# Patient Record
Sex: MALE | Race: BLACK OR AFRICAN AMERICAN | NOT HISPANIC OR LATINO
[De-identification: names, ages, dates, MRNs, and addresses within clinical notes are randomized per-mention and may not be internally consistent; named-entity substitution may affect disease eponyms.]

---

## 2019-04-24 ENCOUNTER — APPOINTMENT (OUTPATIENT)
Dept: ORTHOPEDIC SURGERY | Facility: CLINIC | Age: 54
End: 2019-04-24
Payer: COMMERCIAL

## 2019-04-24 PROBLEM — Z00.00 ENCOUNTER FOR PREVENTIVE HEALTH EXAMINATION: Status: ACTIVE | Noted: 2019-04-24

## 2019-04-24 PROCEDURE — 20610 DRAIN/INJ JOINT/BURSA W/O US: CPT | Mod: 50

## 2019-04-24 PROCEDURE — 99213 OFFICE O/P EST LOW 20 MIN: CPT | Mod: 25

## 2019-04-24 NOTE — PROCEDURE
[de-identified] : Patient has demonstrated limited relief from NSAIDS, rest, exercises / PT, and after discussion of the risks and benefits, the patient has elected to proceed with a corticosteroid injection into the BOTH knee(s) via an Anterolateral site.\par Confirmed that the patient does not have history of prior adverse reactions, active, infections, or relevant allergies.   There was no erythema or warmth, and the skin was clear.  The skin was sterilized with alcohol and via sterile technique, the knee was injected 3 cc of 1% xylocaine with 5 mg Kenalog.  The injection was completed without complication and a bandage was applied.  The patient tolerated the procedure well and was given post-injection instructions.

## 2019-04-24 NOTE — HISTORY OF PRESENT ILLNESS
[de-identified] : Patient is status patient coming of complaints of bilateral knee pain. Is a diagnoses of bilateral knee arthritis. He's been managed with pain medication home exercises injections in the past.

## 2019-04-24 NOTE — PHYSICAL EXAM
[de-identified] : Constitutional: General Appearance: healthy-appearing, NAD, and normal body habitus.  \par \par \par Gait and Station: Appearance: normal gait, no limp, and ambulates with no assitive devices.  \par \par \par Knees: Inspection Left: well healed previous surgical incision. Bony Palpation Right: no tenderness of the inferior pole patella, the superior pole patella, the tibial tubercle, the adductor tubercle, the medial joint line, the lateral joint line, the lateral tibial plateau, or Gerdy's tubercle and tenderness of the lateral patellar facet, the medial patellar facet, the medial femoral condyle, the medial tibial plateau, and the lateral femoral condyle. Bony Palpation Left: no tenderness of the inferior pole patella, the superior pole patella, the tibial tubercle, the adductor tubercle, the medial joint line, the lateral joint line, the lateral tibial plateau, or Gerdy's tubercle and tenderness of the lateral patellar facet, the medial patellar facet, the medial femoral condyle, the medial tibial plateau, and the lateral femoral condyle. Soft Tissue Palpation Right: tenderness of the lateral patellar retinaculum and the medial patellar retinaculum. Soft Tissue Palpation Left: tenderness of the lateral patellar retinaculum and the medial patellar retinaculum. Active Range of Motion Right: crepitus, flexion (120 deg), extension (3 deg.), and pain on flexion at (120 deg.). Active Range of Motion Left: crepitus, flexion (125 deg.), extension (3 deg.), and pain on flexion at (125 deg.). Strength Right: flexion 5/5 and extension 5/5. Strength Left: flexion 5/5 and extension 5/5.  \par \par \par Skin: Right Lower Extremity: normal. Left Lower Extremity: normal.  \par \par \par Cardiovascular System: Varicosities Right: capillary refill test normal. Varicosities Left: capillary refill test normal.  \par \par \par Neurologic: Sensation on the Right: normal sensation. Sensation on the Left: normal sensation.  \par \par \par Psychiatric: Mood and Affect: normal mood and affect and active and alert.

## 2019-05-29 ENCOUNTER — OTHER (OUTPATIENT)
Age: 54
End: 2019-05-29

## 2019-06-24 ENCOUNTER — OTHER (OUTPATIENT)
Age: 54
End: 2019-06-24

## 2019-07-30 ENCOUNTER — OTHER (OUTPATIENT)
Age: 54
End: 2019-07-30

## 2019-09-06 ENCOUNTER — OTHER (OUTPATIENT)
Age: 54
End: 2019-09-06

## 2019-10-03 ENCOUNTER — RX RENEWAL (OUTPATIENT)
Age: 54
End: 2019-10-03

## 2019-10-30 ENCOUNTER — APPOINTMENT (OUTPATIENT)
Dept: ORTHOPEDIC SURGERY | Facility: CLINIC | Age: 54
End: 2019-10-30
Payer: COMMERCIAL

## 2019-10-30 PROCEDURE — 20610 DRAIN/INJ JOINT/BURSA W/O US: CPT | Mod: 50

## 2019-10-30 PROCEDURE — 99213 OFFICE O/P EST LOW 20 MIN: CPT | Mod: 25

## 2019-10-30 NOTE — PHYSICAL EXAM
[de-identified] : Constitutional: General Appearance: healthy-appearing, NAD, and normal body habitus.  \par \par \par Gait and Station: Appearance: normal gait, no limp, and ambulates with no assitive devices.  \par \par \par Knees: Inspection Left: well healed previous surgical incision. Bony Palpation Right: no tenderness of the inferior pole patella, the superior pole patella, the tibial tubercle, the adductor tubercle, the medial joint line, the lateral joint line, the lateral tibial plateau, or Gerdy's tubercle and tenderness of the lateral patellar facet, the medial patellar facet, the medial femoral condyle, the medial tibial plateau, and the lateral femoral condyle. Bony Palpation Left: no tenderness of the inferior pole patella, the superior pole patella, the tibial tubercle, the adductor tubercle, the medial joint line, the lateral joint line, the lateral tibial plateau, or Gerdy's tubercle and tenderness of the lateral patellar facet, the medial patellar facet, the medial femoral condyle, the medial tibial plateau, and the lateral femoral condyle. Soft Tissue Palpation Right: tenderness of the lateral patellar retinaculum and the medial patellar retinaculum. Soft Tissue Palpation Left: tenderness of the lateral patellar retinaculum and the medial patellar retinaculum. Active Range of Motion Right: crepitus, flexion (120 deg), extension (3 deg.), and pain on flexion at (120 deg.). Active Range of Motion Left: crepitus, flexion (125 deg.), extension (3 deg.), and pain on flexion at (125 deg.). Strength Right: flexion 5/5 and extension 5/5. Strength Left: flexion 5/5 and extension 5/5.  \par \par \par Skin: Right Lower Extremity: normal. Left Lower Extremity: normal.  \par \par \par Cardiovascular System: Varicosities Right: capillary refill test normal. Varicosities Left: capillary refill test normal.  \par \par \par Neurologic: Sensation on the Right: normal sensation. Sensation on the Left: normal sensation.  \par \par \par Psychiatric: Mood and Affect: normal mood and affect and active and alert.

## 2019-10-30 NOTE — HISTORY OF PRESENT ILLNESS
[de-identified] : Patient is status patient coming of complaints of bilateral knee pain. Is a diagnoses of bilateral knee arthritis. He's been managed with pain medication home exercises injections in the past.

## 2019-10-30 NOTE — PROCEDURE
[de-identified] : Patient has demonstrated limited relief from NSAIDS, rest, exercises / PT, and after discussion of the risks and benefits, the patient has elected to proceed with a corticosteroid injection into the BOTH knee(s) via an Anterolateral site.\par Confirmed that the patient does not have history of prior adverse reactions, active, infections, or relevant allergies.   There was no erythema or warmth, and the skin was clear.  The skin was sterilized with alcohol and via sterile technique, the knee was injected 3 cc of 1% xylocaine with 5 mg Kenalog.  The injection was completed without complication and a bandage was applied.  The patient tolerated the procedure well and was given post-injection instructions.

## 2019-11-13 ENCOUNTER — RX RENEWAL (OUTPATIENT)
Age: 54
End: 2019-11-13

## 2019-11-14 ENCOUNTER — RX RENEWAL (OUTPATIENT)
Age: 54
End: 2019-11-14

## 2019-11-20 ENCOUNTER — RX RENEWAL (OUTPATIENT)
Age: 54
End: 2019-11-20

## 2019-12-31 ENCOUNTER — RX RENEWAL (OUTPATIENT)
Age: 54
End: 2019-12-31

## 2020-07-07 ENCOUNTER — APPOINTMENT (OUTPATIENT)
Dept: ORTHOPEDIC SURGERY | Facility: CLINIC | Age: 55
End: 2020-07-07
Payer: COMMERCIAL

## 2020-07-07 VITALS — WEIGHT: 205 LBS | BODY MASS INDEX: 32.18 KG/M2 | HEIGHT: 67 IN

## 2020-07-07 VITALS — TEMPERATURE: 95.1 F

## 2020-07-07 PROCEDURE — 20610 DRAIN/INJ JOINT/BURSA W/O US: CPT | Mod: 50

## 2020-07-07 PROCEDURE — 99213 OFFICE O/P EST LOW 20 MIN: CPT | Mod: 25

## 2020-07-08 NOTE — ASSESSMENT
[FreeTextEntry1] : Discussed at length with patient exam history and imaging.  Patient is time elects repeat injection and I will send him for consultation with Dr. Rosales as he is entertaining total knee replacements at this time\par

## 2020-07-08 NOTE — PHYSICAL EXAM
[de-identified] : Bilateral knee\par \par Constitutional: \par The patient is healthy-appearing and in no apparent distress. \par \par Gait:\par The patient ambulates with a normal gait and no limp.\par \par Cardiovascular System: \par The capillary refill is less than 2 seconds. \par \par Skin: \par There are no skin abnormalities.\par \par Right Knee:\par  \par Bony Palpation: \par There is tenderness of the medial joint line. \par There is no tenderness of the lateral joint line.\par There is tenderness of the medial femoral chondyle.\par There is tenderness of the lateral femoral chondyle.\par There is no tenderness of the tibial tubercle.\par There is no tenderness of the superior patella.\par There is no tenderness of the inferior patella.\par There is tenderness of the medial patellar facet.\par There is tenderness of the lateral patellar facet.\par \par Soft Tissue Palpation: \par There is no tenderness of the medial retinaculum.\par There is no tenderness of the lateral retinaculum.\par There is no tenderness of the quadriceps tendon.\par There is no tenderness of the patella tendon.\par There is no tenderness of the ITB.\par There is no tenderness of the pes anserine.\par \par Active Range of Motion: \par The range of motion at the knee actively and passively is 3 - 125. \par \par Special Tests: \par There is a negative Apley.\par There is a negative Steinmanns. \par There is a negative Lachman and Anterior Drawer.\par There is a negative Posterior Drawer.  \par There is no varus or valgus laxity.\par \par Strength: \par There is 5/5 hip flexion and 5/5 knee flexion and extension.  \par \par Left Knee:\par  \par Bony Palpation: \par There is tenderness of the medial joint line. \par There is tenderness of the lateral joint line.\par There is tenderness of the medial femoral chondyle.\par There is tenderness of the lateral femoral chondyle.\par There is no tenderness of the tibial tubercle.\par There is no tenderness of the superior patella.\par There is no tenderness of the inferior patella.\par There is tenderness of the medial patellar facet.\par There is tenderness of the lateral patellar facet.\par \par Soft Tissue Palpation: \par There is no tenderness of the medial retinaculum.\par There is no tenderness of the lateral retinaculum.\par There is no tenderness of the quadriceps tendon.\par There is no tenderness of the patella tendon.\par There is no tenderness of the ITB.\par There is no tenderness of the pes anserine.\par \par Active Range of Motion: \par The range of motion at the knee actively and passively is 3 - 125. \par \par Special Tests: \par There is a negative Apley.\par There is a negative Steinmanns. \par There is a negative Lachman and Anterior Drawer.\par There is a negative Posterior Drawer.  \par There is no varus or valgus laxity.\par \par Strength: \par There is 5/5 hip flexion and 5/5 knee flexion and extension.  \par \par Psychiatric: \par The patient demonstrates a normal mood and affect and is active and alert\par

## 2020-07-08 NOTE — HISTORY OF PRESENT ILLNESS
[de-identified] : Patient is status patient coming of complaints of bilateral knee pain. Is a diagnoses of bilateral knee arthritis. He's been managed with pain medication as well as home exercises and injections in the past.

## 2020-07-08 NOTE — PROCEDURE
[de-identified] : Patient has demonstrated limited relief from NSAIDS, rest, exercises / PT, and after discussion of the risks and benefits, the patient has elected to proceed with a corticosteroid injection into the BOTH knee(s) via an Anterolateral site.\par Confirmed that the patient does not have history of prior adverse reactions, active, infections, or relevant allergies.   There was no erythema or warmth, and the skin was clear.  The skin was sterilized with alcohol and via sterile technique, the knee was injected 3 cc of 1% xylocaine with 5 mg Kenalog.  The injection was completed without complication and a bandage was applied.  The patient tolerated the procedure well and was given post-injection instructions.

## 2020-11-03 ENCOUNTER — APPOINTMENT (OUTPATIENT)
Dept: ORTHOPEDIC SURGERY | Facility: CLINIC | Age: 55
End: 2020-11-03
Payer: COMMERCIAL

## 2020-11-03 VITALS — WEIGHT: 205 LBS | HEIGHT: 67 IN | BODY MASS INDEX: 32.18 KG/M2

## 2020-11-03 PROCEDURE — 99072 ADDL SUPL MATRL&STAF TM PHE: CPT

## 2020-11-03 PROCEDURE — 20610 DRAIN/INJ JOINT/BURSA W/O US: CPT | Mod: 50

## 2020-11-03 PROCEDURE — 99213 OFFICE O/P EST LOW 20 MIN: CPT | Mod: 25

## 2020-11-03 NOTE — HISTORY OF PRESENT ILLNESS
[de-identified] : Patient is an established patient coming of complaints of bilateral knee pain with a diagnoses of bilateral knee arthritis. He's been managed with pain medication as well as home exercises and injections in the past.

## 2020-11-03 NOTE — ASSESSMENT
[FreeTextEntry1] : Discussed at length with patient exam history and imaging.  Patient is time elects repeat injection\par

## 2020-11-03 NOTE — PROCEDURE
[de-identified] : Patient has demonstrated limited relief from NSAIDS, rest, exercises / PT, and after discussion of the risks and benefits, the patient has elected to proceed with a corticosteroid injection into the BOTH knees via an Anterolateral site.\par Confirmed that the patient does not have history of prior adverse reactions, active, infections, or relevant allergies.   There was no erythema or warmth, and the skin was clear.  The skin was sterilized with alcohol and via sterile technique, the knee was injected 3 cc of 1% xylocaine with 5 mg Kenalog.  The injection was completed without complication and a bandage was applied.  The patient tolerated the procedure well and was given post-injection instructions.

## 2020-11-03 NOTE — PHYSICAL EXAM
[de-identified] : Bilateral knee\par \par Constitutional: \par The patient is healthy-appearing and in no apparent distress. \par \par Gait:\par The patient ambulates with a normal gait and no limp.\par \par Cardiovascular System: \par The capillary refill is less than 2 seconds. \par \par Skin: \par There are no skin abnormalities.\par \par Right Knee:\par  \par Bony Palpation: \par There is tenderness of the medial joint line. \par There is no tenderness of the lateral joint line.\par There is tenderness of the medial femoral chondyle.\par There is tenderness of the lateral femoral chondyle.\par There is no tenderness of the tibial tubercle.\par There is no tenderness of the superior patella.\par There is no tenderness of the inferior patella.\par There is tenderness of the medial patellar facet.\par There is tenderness of the lateral patellar facet.\par \par Soft Tissue Palpation: \par There is no tenderness of the medial retinaculum.\par There is no tenderness of the lateral retinaculum.\par There is no tenderness of the quadriceps tendon.\par There is no tenderness of the patella tendon.\par There is no tenderness of the ITB.\par There is no tenderness of the pes anserine.\par \par Active Range of Motion: \par The range of motion at the knee actively and passively is 3 - 125. \par \par Special Tests: \par There is a negative Apley.\par There is a negative Steinmanns. \par There is a negative Lachman and Anterior Drawer.\par There is a negative Posterior Drawer.  \par There is no varus or valgus laxity.\par \par Strength: \par There is 5/5 hip flexion and 5/5 knee flexion and extension.  \par \par Left Knee:\par  \par Bony Palpation: \par There is tenderness of the medial joint line. \par There is tenderness of the lateral joint line.\par There is tenderness of the medial femoral chondyle.\par There is tenderness of the lateral femoral chondyle.\par There is no tenderness of the tibial tubercle.\par There is no tenderness of the superior patella.\par There is no tenderness of the inferior patella.\par There is tenderness of the medial patellar facet.\par There is tenderness of the lateral patellar facet.\par \par Soft Tissue Palpation: \par There is no tenderness of the medial retinaculum.\par There is no tenderness of the lateral retinaculum.\par There is no tenderness of the quadriceps tendon.\par There is no tenderness of the patella tendon.\par There is no tenderness of the ITB.\par There is no tenderness of the pes anserine.\par \par Active Range of Motion: \par The range of motion at the knee actively and passively is 3 - 125. \par \par Special Tests: \par There is a negative Apley.\par There is a negative Steinmanns. \par There is a negative Lachman and Anterior Drawer.\par There is a negative Posterior Drawer.  \par There is no varus or valgus laxity.\par \par Strength: \par There is 5/5 hip flexion and 5/5 knee flexion and extension.  \par \par Psychiatric: \par The patient demonstrates a normal mood and affect and is active and alert\par

## 2020-12-22 ENCOUNTER — RX RENEWAL (OUTPATIENT)
Age: 55
End: 2020-12-22

## 2021-05-12 ENCOUNTER — NON-APPOINTMENT (OUTPATIENT)
Age: 56
End: 2021-05-12

## 2021-05-12 ENCOUNTER — APPOINTMENT (OUTPATIENT)
Dept: ORTHOPEDIC SURGERY | Facility: CLINIC | Age: 56
End: 2021-05-12
Payer: COMMERCIAL

## 2021-05-12 PROCEDURE — 99072 ADDL SUPL MATRL&STAF TM PHE: CPT

## 2021-05-12 PROCEDURE — 99214 OFFICE O/P EST MOD 30 MIN: CPT

## 2021-05-12 RX ORDER — NABUMETONE 500 MG/1
500 TABLET, FILM COATED ORAL
Qty: 60 | Refills: 2 | Status: ACTIVE | COMMUNITY
Start: 2020-07-07 | End: 1900-01-01

## 2021-05-12 NOTE — PHYSICAL EXAM
[de-identified] : Bilateral knee\par \par Constitutional: \par The patient is healthy-appearing and in no apparent distress. \par \par Gait:\par The patient ambulates with a cane and antalgic limp.\par \par Cardiovascular System: \par The capillary refill is less than 2 seconds. \par \par Skin: \par There are no skin abnormalities.\par \par Right Knee:\par  \par Bony Palpation: \par There is tenderness of the medial joint line. \par There is no tenderness of the lateral joint line.\par There is tenderness of the medial femoral chondyle.\par There is tenderness of the lateral femoral chondyle.\par There is no tenderness of the tibial tubercle.\par There is no tenderness of the superior patella.\par There is no tenderness of the inferior patella.\par There is tenderness of the medial patellar facet.\par There is tenderness of the lateral patellar facet.\par \par Soft Tissue Palpation: \par There is no tenderness of the medial retinaculum.\par There is no tenderness of the lateral retinaculum.\par There is no tenderness of the quadriceps tendon.\par There is no tenderness of the patella tendon.\par There is no tenderness of the ITB.\par There is no tenderness of the pes anserine.\par \par Active Range of Motion: \par The range of motion at the knee actively and passively is 3 - 125. \par \par Special Tests: \par There is a negative Apley.\par There is a negative Steinmanns. \par There is a negative Lachman and Anterior Drawer.\par There is a negative Posterior Drawer.  \par There is no varus or valgus laxity.\par \par Strength: \par There is 5/5 hip flexion and 5/5 knee flexion and extension.  \par \par Left Knee:\par  \par Bony Palpation: \par There is tenderness of the medial joint line. \par There is tenderness of the lateral joint line.\par There is tenderness of the medial femoral chondyle.\par There is tenderness of the lateral femoral chondyle.\par There is no tenderness of the tibial tubercle.\par There is no tenderness of the superior patella.\par There is no tenderness of the inferior patella.\par There is tenderness of the medial patellar facet.\par There is tenderness of the lateral patellar facet.\par \par Soft Tissue Palpation: \par There is no tenderness of the medial retinaculum.\par There is no tenderness of the lateral retinaculum.\par There is no tenderness of the quadriceps tendon.\par There is no tenderness of the patella tendon.\par There is no tenderness of the ITB.\par There is no tenderness of the pes anserine.\par \par Active Range of Motion: \par The range of motion at the knee actively and passively is 3 - 125. \par \par Special Tests: \par There is a negative Apley.\par There is a negative Steinmanns. \par There is a negative Lachman and Anterior Drawer.\par There is a negative Posterior Drawer.  \par There is no varus or valgus laxity.\par \par Strength: \par There is 5/5 hip flexion and 5/5 knee flexion and extension.  \par \par Psychiatric: \par The patient demonstrates a normal mood and affect and is active and alert\par  [de-identified] : X-ray bilateral knee.  There is severe medial compartment arthritis on the left otherwise moderate to severe tricompartmental arthritis bilaterally

## 2021-05-12 NOTE — ASSESSMENT
[FreeTextEntry1] : Discussed at length the patient underlying arthritis and treatment options and at this point patient would like to proceed with total knee replacement.  He was given a recommendation for a joint replacement surgeon specializing in total knee replacements.  Patient will contact the office and will the office contact him to schedule an appointment

## 2021-05-12 NOTE — HISTORY OF PRESENT ILLNESS
5/17/2017      Jaqueline Bonilla  [address] [facility] [id number]        Dear Jaqueline:    Our medical records indicate that you are over due for:    Well Woman Exam/PAP test:  To screen for cervical cancer.  Your last recorded pap was done in July of 2011, and advised to be re-screened in 5 years.    Please contact us at the clinic if you have these tests done elsewhere, so that we can update your medical record and have the results forwarded to this office.    Please call our office at [phone] as soon as possible to make an appointment and/or to get these tests ordered.  Feel free to call with any questions or concerns.    Your health is important to us -- cancer screening is important for you!    Sincerely,  [facility]              [address]  [place] [address]  Telephone: [phone] Fax: [phone]  [email or web address]      
[de-identified] : Patient is an established patient last seen back in November with bilateral knee arthritis.  He states he is coming in for further discussion as the pain is severe to the point that he is considering total knee replacements.

## 2021-05-24 ENCOUNTER — APPOINTMENT (OUTPATIENT)
Dept: ORTHOPEDIC SURGERY | Facility: CLINIC | Age: 56
End: 2021-05-24
Payer: COMMERCIAL

## 2021-05-24 VITALS
BODY MASS INDEX: 29.03 KG/M2 | SYSTOLIC BLOOD PRESSURE: 120 MMHG | WEIGHT: 185 LBS | DIASTOLIC BLOOD PRESSURE: 80 MMHG | HEIGHT: 67 IN

## 2021-05-24 PROCEDURE — 99072 ADDL SUPL MATRL&STAF TM PHE: CPT

## 2021-05-24 PROCEDURE — 99215 OFFICE O/P EST HI 40 MIN: CPT

## 2021-05-24 RX ORDER — MELOXICAM 15 MG/1
15 TABLET ORAL DAILY
Qty: 30 | Refills: 2 | Status: ACTIVE | COMMUNITY
Start: 2021-05-24 | End: 1900-01-01

## 2021-05-24 RX ORDER — TRAMADOL HYDROCHLORIDE 50 MG/1
50 TABLET, COATED ORAL
Qty: 90 | Refills: 0 | Status: ACTIVE | COMMUNITY
Start: 2021-05-24 | End: 1900-01-01

## 2021-05-24 NOTE — HISTORY OF PRESENT ILLNESS
[Worsening] : worsening [___ yrs] : [unfilled] year(s) ago [8] : a current pain level of 8/10 [Bending] : worsened by bending [Sitting] : worsened by sitting [Walking] : worsened by walking [Opioid Analgesics] : relieved by opioid analgesics [de-identified] : 54y/o male referred by Dr. Szymanski for evaluation of bilateral knee osteoarthritis. Symptoms have been progressive for over 10 years. Has been treated in past with serial injections, most recently CSI in Nov 2020, with decreasing efficacy. Also had HA injections with limited relief; Tylenol and various NSAIDs without much relief; also taking 1-2 Percocets a day which he feels is the only medication which keeps the pain at bay. Right side is more painful but both knees are severely stiff. He walks with a cane at all times and struggles to make it a single block. Was placed on medical leave from his job as a  6 months ago because his boss felt he was unsteady and unsafe at the job side due to his knees. \par \par Denies PMH but notes bilateral lower extremity edema that has been present for some months. PSH significant for 4x left patellar tendon surgeries including to address a postoperative infection and a removal of hardware, beginning in 2011. No prior R knee surgeries. [de-identified] : describes constamt shooting pain 98.4

## 2021-05-24 NOTE — PHYSICAL EXAM
[de-identified] : General appearance: well nourished and hydrated, pleasant, alert and oriented x 3, cooperative.  \par HEENT: normocephalic, EOM intact, wearing mask, external auditory canal clear.  \par Cardiovascular: symmetric bilateral 2+ pitting lower leg and foot/ankle edema, no varicosities, dorsalis pedis pulses palpable and symmetric.  \par Lymphatics: no palpable lymphadenopathy, no lymphedema.  \par Neurologic: sensation is normal, no muscle weakness in upper or lower extremities, patella tendon reflexes present and symmetric.  \par Dermatologic: skin moist, warm, no rash.  \par Spine: cervical spine with normal lordosis and painless range of motion, thoracic spine with normal kyphosis and painless range of motion, lumbosacral spine with normal lordosis and painless range of motion. \par Gait: crouched gait with cane, bilaterally antalgic.\par \par Left knee:\par - Effusion: moderate\par - Soft tissue swelling: moderate\par - Ecchymosis: none\par - Erythema: none\par - Wounds: healed midline incision\par - Alignment: non-correctable varus\par - Tenderness: circumferential\par - ROM: 20-90\par - Ligamentous laxity: negative Lachman, negative ant. drawer test, negative post. drawer test, stable to varus stress, stable to valgus stress.  \par - Popliteal angle (degrees): 60\par - Quad strength: 5/5\par \par Right knee:\par - Effusion: moderate\par - Soft tissue swelling: moderate\par - Ecchymosis: none\par - Erythema: none\par - Wounds: none\par - Alignment: non-correctable varus\par - Tenderness: circumferential\par - ROM: 20-90\par - Ligamentous laxity: negative Lachman, negative ant. drawer test, negative post. drawer test, stable to varus stress, stable to valgus stress.  \par - Popliteal angle (degrees): 60\par - Quad strength: 5/5 [de-identified] : Bilateral knee XRs available on [employer] [facility] dated 5/12/21 from the [place] location were interpreted by me and reviewed with him.\par \par Right knee --\par Alignment: varus\par Arthritis: severe tricompartmental, KL4\par Deformity: none\par Patellar height: normal\par Patellar tracking: central\par \par Left knee --\par Alignment: varus\par Arthritis: severe tricompartmental, KL4\par Deformity: none\par Patellar height: normal\par Patellar tracking: central

## 2021-05-24 NOTE — DISCUSSION/SUMMARY
[de-identified] : 54y/o male with bilateral knee osteoarthritis, R > L, s/p left patellar tendon repair\par - Due to his severe pain, flexion contractures, and loss of function despite exhaustive conservative management, he is indicated for simultaneous bilateral total knee arthroplasty (on day of surgery, would start with right side first)\par - We discussed the details of the procedure, the expected recovery period, and the expected outcome. We discussed the likelihood of satisfaction after complete recovery, and the potential causes of dissatisfaction. The importance of active patient participation in the rehabilitation protocol was emphasized, along with its influence on short and long-term outcomes. Specific risks of total knee replacement were discussed in detail. We discussed the risk of surgical site complications including but not limited to: surgical site infection, wound healing complications, bone fracture, tendon or ligament injury, neurovascular injury, hemorrhage, postoperative stiffness or instability, persistent pain and need for reoperation or manipulation under anesthesia. We discussed surgical blood loss and the possible need for blood transfusion. We discussed the risk of perioperative medical complications, including but not limited to catheter-associated urinary tract infection, venous thromboembolism and other cardiopulmonary complications. We discussed anesthetic options and the risk of anesthesia-related complications. We discussed implant fixation methods; my plan would be to use cementless fixation if possible case. We discussed the variable need to resurface the patella; my plan would be to resurface in this case. We discussed the durability of prosthetic knees and limitations related to wear, osteolysis and loosening. All questions were answered the patient's satisfaction. The patient was given a copy of my preoperative packet with additional information about the procedure. I asked the patient to either call back or schedule a followup appointment for any additional questions or concerns regarding the procedure.\par - Book for surgery at a convenient date\par - CT, clearance, class preoperatively

## 2021-05-28 ENCOUNTER — APPOINTMENT (OUTPATIENT)
Dept: INTERNAL MEDICINE | Facility: CLINIC | Age: 56
End: 2021-05-28

## 2021-06-10 ENCOUNTER — APPOINTMENT (OUTPATIENT)
Dept: ORTHOPEDIC SURGERY | Facility: CLINIC | Age: 56
End: 2021-06-10

## 2021-06-28 ENCOUNTER — APPOINTMENT (OUTPATIENT)
Dept: ORTHOPEDIC SURGERY | Facility: CLINIC | Age: 56
End: 2021-06-28
Payer: COMMERCIAL

## 2021-07-08 ENCOUNTER — APPOINTMENT (OUTPATIENT)
Dept: ORTHOPEDIC SURGERY | Facility: CLINIC | Age: 56
End: 2021-07-08
Payer: COMMERCIAL

## 2021-07-08 ENCOUNTER — OUTPATIENT (OUTPATIENT)
Dept: OUTPATIENT SERVICES | Facility: HOSPITAL | Age: 56
LOS: 1 days | End: 2021-07-08
Payer: COMMERCIAL

## 2021-07-08 ENCOUNTER — RESULT REVIEW (OUTPATIENT)
Age: 56
End: 2021-07-08

## 2021-07-08 VITALS
TEMPERATURE: 96.7 F | WEIGHT: 182 LBS | HEART RATE: 82 BPM | BODY MASS INDEX: 28.56 KG/M2 | DIASTOLIC BLOOD PRESSURE: 83 MMHG | OXYGEN SATURATION: 98 % | SYSTOLIC BLOOD PRESSURE: 145 MMHG | HEIGHT: 67 IN

## 2021-07-08 DIAGNOSIS — Z01.818 ENCOUNTER FOR OTHER PREPROCEDURAL EXAMINATION: ICD-10-CM

## 2021-07-08 LAB
A1C WITH ESTIMATED AVERAGE GLUCOSE RESULT: 5 % — SIGNIFICANT CHANGE UP (ref 4–5.6)
ALBUMIN SERPL ELPH-MCNC: 4.2 G/DL — SIGNIFICANT CHANGE UP (ref 3.3–5)
ALP SERPL-CCNC: 85 U/L — SIGNIFICANT CHANGE UP (ref 40–120)
ALT FLD-CCNC: 9 U/L — LOW (ref 10–45)
ANION GAP SERPL CALC-SCNC: 11 MMOL/L — SIGNIFICANT CHANGE UP (ref 5–17)
APPEARANCE UR: CLEAR — SIGNIFICANT CHANGE UP
APTT BLD: 36.1 SEC — HIGH (ref 27.5–35.5)
AST SERPL-CCNC: 14 U/L — SIGNIFICANT CHANGE UP (ref 10–40)
BACTERIA # UR AUTO: PRESENT /HPF
BASOPHILS # BLD AUTO: 0.05 K/UL — SIGNIFICANT CHANGE UP (ref 0–0.2)
BASOPHILS NFR BLD AUTO: 0.6 % — SIGNIFICANT CHANGE UP (ref 0–2)
BILIRUB SERPL-MCNC: 0.3 MG/DL — SIGNIFICANT CHANGE UP (ref 0.2–1.2)
BILIRUB UR-MCNC: NEGATIVE — SIGNIFICANT CHANGE UP
BUN SERPL-MCNC: 44 MG/DL — HIGH (ref 7–23)
CALCIUM SERPL-MCNC: 9.9 MG/DL — SIGNIFICANT CHANGE UP (ref 8.4–10.5)
CHLORIDE SERPL-SCNC: 106 MMOL/L — SIGNIFICANT CHANGE UP (ref 96–108)
CO2 SERPL-SCNC: 23 MMOL/L — SIGNIFICANT CHANGE UP (ref 22–31)
COLOR SPEC: YELLOW — SIGNIFICANT CHANGE UP
CREAT SERPL-MCNC: 2.63 MG/DL — HIGH (ref 0.5–1.3)
DIFF PNL FLD: ABNORMAL
EOSINOPHIL # BLD AUTO: 0.25 K/UL — SIGNIFICANT CHANGE UP (ref 0–0.5)
EOSINOPHIL NFR BLD AUTO: 3.2 % — SIGNIFICANT CHANGE UP (ref 0–6)
ESTIMATED AVERAGE GLUCOSE: 97 MG/DL — SIGNIFICANT CHANGE UP (ref 68–114)
GLUCOSE SERPL-MCNC: 95 MG/DL — SIGNIFICANT CHANGE UP (ref 70–99)
GLUCOSE UR QL: NEGATIVE — SIGNIFICANT CHANGE UP
HCT VFR BLD CALC: 33.8 % — LOW (ref 39–50)
HGB BLD-MCNC: 10.9 G/DL — LOW (ref 13–17)
HYALINE CASTS # UR AUTO: SIGNIFICANT CHANGE UP /LPF (ref 0–2)
IMM GRANULOCYTES NFR BLD AUTO: 0.3 % — SIGNIFICANT CHANGE UP (ref 0–1.5)
INR BLD: 1.21 — HIGH (ref 0.88–1.16)
KETONES UR-MCNC: NEGATIVE — SIGNIFICANT CHANGE UP
LEUKOCYTE ESTERASE UR-ACNC: NEGATIVE — SIGNIFICANT CHANGE UP
LYMPHOCYTES # BLD AUTO: 1.83 K/UL — SIGNIFICANT CHANGE UP (ref 1–3.3)
LYMPHOCYTES # BLD AUTO: 23.6 % — SIGNIFICANT CHANGE UP (ref 13–44)
MCHC RBC-ENTMCNC: 26.1 PG — LOW (ref 27–34)
MCHC RBC-ENTMCNC: 32.2 GM/DL — SIGNIFICANT CHANGE UP (ref 32–36)
MCV RBC AUTO: 80.9 FL — SIGNIFICANT CHANGE UP (ref 80–100)
MONOCYTES # BLD AUTO: 0.64 K/UL — SIGNIFICANT CHANGE UP (ref 0–0.9)
MONOCYTES NFR BLD AUTO: 8.2 % — SIGNIFICANT CHANGE UP (ref 2–14)
NEUTROPHILS # BLD AUTO: 4.98 K/UL — SIGNIFICANT CHANGE UP (ref 1.8–7.4)
NEUTROPHILS NFR BLD AUTO: 64.1 % — SIGNIFICANT CHANGE UP (ref 43–77)
NITRITE UR-MCNC: NEGATIVE — SIGNIFICANT CHANGE UP
NRBC # BLD: 0 /100 WBCS — SIGNIFICANT CHANGE UP (ref 0–0)
PH UR: 5.5 — SIGNIFICANT CHANGE UP (ref 5–8)
PLATELET # BLD AUTO: 199 K/UL — SIGNIFICANT CHANGE UP (ref 150–400)
POTASSIUM SERPL-MCNC: 4.2 MMOL/L — SIGNIFICANT CHANGE UP (ref 3.5–5.3)
POTASSIUM SERPL-SCNC: 4.2 MMOL/L — SIGNIFICANT CHANGE UP (ref 3.5–5.3)
PROT SERPL-MCNC: 8.1 G/DL — SIGNIFICANT CHANGE UP (ref 6–8.3)
PROT UR-MCNC: >=300 MG/DL
PROTHROM AB SERPL-ACNC: 14.4 SEC — HIGH (ref 10.6–13.6)
RBC # BLD: 4.18 M/UL — LOW (ref 4.2–5.8)
RBC # FLD: 13.9 % — SIGNIFICANT CHANGE UP (ref 10.3–14.5)
RBC CASTS # UR COMP ASSIST: < 5 /HPF — SIGNIFICANT CHANGE UP
SODIUM SERPL-SCNC: 140 MMOL/L — SIGNIFICANT CHANGE UP (ref 135–145)
SP GR SPEC: 1.02 — SIGNIFICANT CHANGE UP (ref 1–1.03)
UROBILINOGEN FLD QL: 0.2 E.U./DL — SIGNIFICANT CHANGE UP
WBC # BLD: 7.77 K/UL — SIGNIFICANT CHANGE UP (ref 3.8–10.5)
WBC # FLD AUTO: 7.77 K/UL — SIGNIFICANT CHANGE UP (ref 3.8–10.5)
WBC UR QL: < 5 /HPF — SIGNIFICANT CHANGE UP

## 2021-07-08 PROCEDURE — 93010 ELECTROCARDIOGRAM REPORT: CPT

## 2021-07-08 PROCEDURE — 99214 OFFICE O/P EST MOD 30 MIN: CPT

## 2021-07-08 PROCEDURE — 80053 COMPREHEN METABOLIC PANEL: CPT

## 2021-07-08 PROCEDURE — 93005 ELECTROCARDIOGRAM TRACING: CPT

## 2021-07-08 PROCEDURE — 85730 THROMBOPLASTIN TIME PARTIAL: CPT

## 2021-07-08 PROCEDURE — 71046 X-RAY EXAM CHEST 2 VIEWS: CPT

## 2021-07-08 PROCEDURE — 85025 COMPLETE CBC W/AUTO DIFF WBC: CPT

## 2021-07-08 PROCEDURE — 85610 PROTHROMBIN TIME: CPT

## 2021-07-08 PROCEDURE — 99072 ADDL SUPL MATRL&STAF TM PHE: CPT

## 2021-07-08 PROCEDURE — 71046 X-RAY EXAM CHEST 2 VIEWS: CPT | Mod: 26

## 2021-07-08 PROCEDURE — 87086 URINE CULTURE/COLONY COUNT: CPT

## 2021-07-08 PROCEDURE — 81001 URINALYSIS AUTO W/SCOPE: CPT

## 2021-07-08 PROCEDURE — 83036 HEMOGLOBIN GLYCOSYLATED A1C: CPT

## 2021-07-08 RX ORDER — HYDROCODONE BITARTRATE AND ACETAMINOPHEN 5; 300 MG/1; MG/1
5-300 TABLET ORAL
Qty: 30 | Refills: 0 | Status: COMPLETED | COMMUNITY
Start: 2019-04-24 | End: 2021-07-08

## 2021-07-08 RX ORDER — HYDROCODONE BITARTRATE AND ACETAMINOPHEN 5; 300 MG/1; MG/1
5-300 TABLET ORAL
Qty: 30 | Refills: 0 | Status: COMPLETED | COMMUNITY
Start: 2019-06-24 | End: 2021-07-08

## 2021-07-08 RX ORDER — HYDROCODONE BITARTRATE AND ACETAMINOPHEN 5; 300 MG/1; MG/1
5-300 TABLET ORAL
Qty: 30 | Refills: 0 | Status: COMPLETED | COMMUNITY
Start: 2020-10-26 | End: 2021-07-08

## 2021-07-08 RX ORDER — HYDROCODONE BITARTRATE AND ACETAMINOPHEN 5; 300 MG/1; MG/1
5-300 TABLET ORAL
Qty: 30 | Refills: 0 | Status: COMPLETED | COMMUNITY
Start: 2019-05-29 | End: 2021-07-08

## 2021-07-08 RX ORDER — HYDROCODONE BITARTRATE AND ACETAMINOPHEN 5; 300 MG/1; MG/1
5-300 TABLET ORAL
Qty: 30 | Refills: 0 | Status: COMPLETED | COMMUNITY
Start: 2020-03-06 | End: 2021-07-08

## 2021-07-09 LAB
CULTURE RESULTS: NO GROWTH — SIGNIFICANT CHANGE UP
SPECIMEN SOURCE: SIGNIFICANT CHANGE UP

## 2021-07-12 ENCOUNTER — APPOINTMENT (OUTPATIENT)
Dept: CT IMAGING | Facility: HOSPITAL | Age: 56
End: 2021-07-12

## 2021-07-12 RX ORDER — PREDNISONE 20 MG/1
20 TABLET ORAL
Qty: 14 | Refills: 0 | Status: ACTIVE | COMMUNITY
Start: 2021-03-20

## 2021-07-12 RX ORDER — LABETALOL HYDROCHLORIDE 100 MG/1
100 TABLET, FILM COATED ORAL
Qty: 60 | Refills: 0 | Status: ACTIVE | COMMUNITY
Start: 2021-02-11

## 2021-07-12 RX ORDER — HYDROCODONE BITARTRATE AND ACETAMINOPHEN 5; 325 MG/1; MG/1
5-325 TABLET ORAL
Qty: 60 | Refills: 0 | Status: ACTIVE | COMMUNITY
Start: 2021-02-25

## 2021-07-12 RX ORDER — COLCHICINE 0.6 MG/1
0.6 TABLET ORAL
Qty: 15 | Refills: 0 | Status: ACTIVE | COMMUNITY
Start: 2021-02-11

## 2021-07-12 RX ORDER — OXYCODONE HYDROCHLORIDE AND ACETAMINOPHEN 5; 325 MG/1; MG/1
5-325 TABLET ORAL
Refills: 0 | Status: ACTIVE | COMMUNITY

## 2021-07-12 NOTE — HISTORY OF PRESENT ILLNESS
[No Pertinent Cardiac History] : no history of aortic stenosis, atrial fibrillation, coronary artery disease, recent myocardial infarction, or implantable device/pacemaker [No Pertinent Pulmonary History] : no history of asthma, COPD, sleep apnea, or smoking [No Adverse Anesthesia Reaction] : no adverse anesthesia reaction in self or family member [(Patient denies any chest pain, claudication, dyspnea on exertion, orthopnea, palpitations or syncope)] : Patient denies any chest pain, claudication, dyspnea on exertion, orthopnea, palpitations or syncope [Moderate (4-6 METs)] : Moderate (4-6 METs) [Chronic Anticoagulation] : no chronic anticoagulation [Chronic Kidney Disease] : no chronic kidney disease [Diabetes] : no diabetes [FreeTextEntry1] : Bilateral Knee Arthroplasty [FreeTextEntry2] : 7/8/2021 [FreeTextEntry3] : Dr. Panchito Nunez [FreeTextEntry4] : The patient is a 55 year old man presenting with knee osteoarthritis. The patient is planned for bilateral total knee arthroplasty with Dr. Panchito Nunez on 7/15/2021\par \par The patient denies recent/active cardiopulmonary symptoms including chest pain, shortness of breath, dyspnea of exertion, palpitations, swelling, headache, dizziness, syncope.\par

## 2021-07-12 NOTE — ASSESSMENT
[As per surgery] : as per surgery [Patient NOT optimized for Surgery at this time] : Patient not optimized for surgery at this time [Cardiology consultation] : Cardiology consultation [Other: _____] : [unfilled] [FreeTextEntry4] : The patient is a 55 year old man presenting with knee osteoarthritis. The patient is planned for bilateral total knee arthroplasty with Dr. Panchito Nunez on 7/15/2021\par \par -Labs/Diagnostics reviewed with patient in detail\par -METS >4\par -RCRI = 1,6.0% 30-day risk of death/MI/cardiac arrest\par \par Patient is at moderate risk for moderate risk surgery/procedure\par \par Patient is NOT medically optimized to proceed with surgery.\par \par The risks/benefits/potential complications of diagnostic testing/treatments were described in detail.\par \par **Case discussed with patient's  specialist(s).\par \par \par Recommendations:\par -Labs showing elevated creatinine suspicious for CKD (unclear baseline), and anemia (possibly AOCD), unclear baseline\par -EKG showing TWI in inferior leads\par -Recommend consultation with Neprology and Cardiology

## 2021-07-14 ENCOUNTER — APPOINTMENT (OUTPATIENT)
Dept: HEART AND VASCULAR | Facility: CLINIC | Age: 56
End: 2021-07-14

## 2021-07-21 ENCOUNTER — NON-APPOINTMENT (OUTPATIENT)
Age: 56
End: 2021-07-21

## 2021-07-21 ENCOUNTER — APPOINTMENT (OUTPATIENT)
Dept: HEART AND VASCULAR | Facility: CLINIC | Age: 56
End: 2021-07-21
Payer: COMMERCIAL

## 2021-07-21 ENCOUNTER — APPOINTMENT (OUTPATIENT)
Dept: NEPHROLOGY | Facility: CLINIC | Age: 56
End: 2021-07-21
Payer: COMMERCIAL

## 2021-07-21 VITALS
HEIGHT: 66.54 IN | BODY MASS INDEX: 28.58 KG/M2 | HEART RATE: 66 BPM | OXYGEN SATURATION: 98 % | TEMPERATURE: 97.9 F | DIASTOLIC BLOOD PRESSURE: 100 MMHG | SYSTOLIC BLOOD PRESSURE: 178 MMHG | WEIGHT: 179.99 LBS

## 2021-07-21 VITALS
WEIGHT: 182 LBS | HEIGHT: 67 IN | DIASTOLIC BLOOD PRESSURE: 91 MMHG | HEART RATE: 80 BPM | SYSTOLIC BLOOD PRESSURE: 152 MMHG | BODY MASS INDEX: 28.56 KG/M2

## 2021-07-21 DIAGNOSIS — Z01.818 ENCOUNTER FOR OTHER PREPROCEDURAL EXAMINATION: ICD-10-CM

## 2021-07-21 DIAGNOSIS — I10 ESSENTIAL (PRIMARY) HYPERTENSION: ICD-10-CM

## 2021-07-21 DIAGNOSIS — R94.31 ABNORMAL ELECTROCARDIOGRAM [ECG] [EKG]: ICD-10-CM

## 2021-07-21 DIAGNOSIS — F17.200 NICOTINE DEPENDENCE, UNSPECIFIED, UNCOMPLICATED: ICD-10-CM

## 2021-07-21 DIAGNOSIS — M17.10 UNILATERAL PRIMARY OSTEOARTHRITIS, UNSPECIFIED KNEE: ICD-10-CM

## 2021-07-21 DIAGNOSIS — E87.2 ACIDOSIS: ICD-10-CM

## 2021-07-21 DIAGNOSIS — N18.4 CHRONIC KIDNEY DISEASE, STAGE 4 (SEVERE): ICD-10-CM

## 2021-07-21 DIAGNOSIS — M10.9 GOUT, UNSPECIFIED: ICD-10-CM

## 2021-07-21 DIAGNOSIS — Z72.3 LACK OF PHYSICAL EXERCISE: ICD-10-CM

## 2021-07-21 PROCEDURE — 99072 ADDL SUPL MATRL&STAF TM PHE: CPT

## 2021-07-21 PROCEDURE — 93000 ELECTROCARDIOGRAM COMPLETE: CPT | Mod: NC

## 2021-07-21 PROCEDURE — 99203 OFFICE O/P NEW LOW 30 MIN: CPT

## 2021-07-21 PROCEDURE — 99204 OFFICE O/P NEW MOD 45 MIN: CPT

## 2021-07-21 RX ORDER — AMLODIPINE BESYLATE 10 MG/1
10 TABLET ORAL
Qty: 30 | Refills: 0 | Status: DISCONTINUED | COMMUNITY
Start: 2021-02-11 | End: 2021-07-21

## 2021-07-21 RX ORDER — AMLODIPINE BESYLATE 5 MG/1
5 TABLET ORAL DAILY
Qty: 90 | Refills: 2 | Status: ACTIVE | COMMUNITY
Start: 2021-07-21 | End: 1900-01-01

## 2021-07-21 NOTE — HISTORY OF PRESENT ILLNESS
[Preoperative Visit] : for a medical evaluation prior to surgery [Scheduled Procedure ___] : a [unfilled] [Date of Surgery ___] : on [unfilled] [Surgeon Name ___] : surgeon: [unfilled] [Good] : Good [Prior Anesthesia] : Prior anesthesia [Diabetes] : no diabetes [Cardiovascular Disease] : no cardiovascular disease [Pulmonary Disease] : no pulmonary disease [Anti-Platelet Agents] : no anti-platelet agents [Nicotine Dependence] : no nicotine dependence [Alcohol Use] : no  alcohol use [Renal Disease] : no renal disease [Sleep Apnea] : no sleep apnea [Thromboembolic Problems] : no thromboembolic problems [Clotting Disorder] : no clotting disorder [Frequent use of NSAIDs] : no use of NSAIDs [Bleeding Disorder] : no bleeding disorder [Frequent Aspirin Use] : no frequent aspirin use [Prev Anesthesia Reaction] : no previous anesthesia reaction [FreeTextEntry1] : 55 M \par \par Has not been taking his BP meds for > 1 year.\par Uses walker because of his bilateral knee arthritis.  Able to walk 1-2 blocks stopping due to leg pain.  It got very bad ~  six months ago.  Before that was very active, walked several miles in a day, going to the gym, 45 minutes walking on a steep incline.  No cp or sob.  No leg swelling.  No orthopnea \par No known hx of CAD or CHF\par \par Fhx: no cad\par Former smoker quit 30 years ago\par No EtOH use\par NKDA

## 2021-07-21 NOTE — ASSESSMENT
[FreeTextEntry1] : 55 m\par \par Preop Cardiovascular Risk Assessment\par Abnormal EKG, septal infarct with SARTHAK in V1-V2\par No signs/symptoms of ischemia or heart failure though he is minimally active given knee pain\par Not able to achieve > 4 METs\par RCRI Score \par \par - ECHO\par - coronary CTA\par - labs\par - agree with starting amlodipine 5mg \par - clearance pending above\par

## 2021-07-21 NOTE — PHYSICAL EXAM
[General Appearance - Alert] : alert [General Appearance - In No Acute Distress] : in no acute distress [Sclera] : the sclera and conjunctiva were normal [PERRL With Normal Accommodation] : pupils were equal in size, round, and reactive to light [Outer Ear] : the ears and nose were normal in appearance [Neck Appearance] : the appearance of the neck was normal [Neck Cervical Mass (___cm)] : no neck mass was observed [Jugular Venous Distention Increased] : there was no jugular-venous distention [Auscultation Breath Sounds / Voice Sounds] : lungs were clear to auscultation bilaterally [Heart Rate And Rhythm] : heart rate was normal and rhythm regular [Heart Sounds] : normal S1 and S2 [Heart Sounds Gallop] : no gallops [Murmurs] : no murmurs [Heart Sounds Pericardial Friction Rub] : no pericardial rub [Edema] : there was no peripheral edema [Skin Color & Pigmentation] : normal skin color and pigmentation [Skin Turgor] : normal skin turgor [] : no rash [Deep Tendon Reflexes (DTR)] : deep tendon reflexes were 2+ and symmetric [No Focal Deficits] : no focal deficits [Oriented To Time, Place, And Person] : oriented to person, place, and time [Impaired Insight] : insight and judgment were intact [Affect] : the affect was normal

## 2021-07-21 NOTE — CONSULT LETTER
[Dear  ___] : Dear  [unfilled], [Consult Letter:] : I had the pleasure of evaluating your patient, [unfilled]. [Please see my note below.] : Please see my note below. [Consult Closing:] : Thank you very much for allowing me to participate in the care of this patient.  If you have any questions, please do not hesitate to contact me. [Sincerely,] : Sincerely, [FreeTextEntry2] : Dr Bharath Eastman [FreeTextEntry1] : Thanks very much for the kind referral -we will try to get him back on the surgical schedule as soon as possible.  Best regards Cirilo [FreeTextEntry3] : Sincerely, \par \par Elpidio Dover MD, FACP  [DrAntonio  ___] : Dr. ORTIZ

## 2021-07-21 NOTE — PHYSICAL EXAM
[General Appearance - Well Developed] : well developed [Normal Appearance] : normal appearance [Well Groomed] : well groomed [General Appearance - Well Nourished] : well nourished [No Deformities] : no deformities [General Appearance - In No Acute Distress] : no acute distress [Normal Conjunctiva] : the conjunctiva exhibited no abnormalities [Eyelids - No Xanthelasma] : the eyelids demonstrated no xanthelasmas [Normal Oral Mucosa] : normal oral mucosa [No Oral Pallor] : no oral pallor [No Oral Cyanosis] : no oral cyanosis [Normal Jugular Venous A Waves Present] : normal jugular venous A waves present [Normal Jugular Venous V Waves Present] : normal jugular venous V waves present [No Jugular Venous Batista A Waves] : no jugular venous batista A waves [Respiration, Rhythm And Depth] : normal respiratory rhythm and effort [Exaggerated Use Of Accessory Muscles For Inspiration] : no accessory muscle use [Auscultation Breath Sounds / Voice Sounds] : lungs were clear to auscultation bilaterally [Heart Rate And Rhythm] : heart rate and rhythm were normal [Heart Sounds] : normal S1 and S2 [Murmurs] : no murmurs present [Abdomen Soft] : soft [Abdomen Tenderness] : non-tender [Abdomen Mass (___ Cm)] : no abdominal mass palpated [Abnormal Walk] : normal gait [Gait - Sufficient For Exercise Testing] : the gait was sufficient for exercise testing [Nail Clubbing] : no clubbing of the fingernails [Cyanosis, Localized] : no localized cyanosis [Petechial Hemorrhages (___cm)] : no petechial hemorrhages [Skin Color & Pigmentation] : normal skin color and pigmentation [] : no rash [No Venous Stasis] : no venous stasis [Skin Lesions] : no skin lesions [No Skin Ulcers] : no skin ulcer [No Xanthoma] : no  xanthoma was observed

## 2021-07-21 NOTE — ASSESSMENT
[FreeTextEntry1] : 55-year-old man with severe osteoarthritis of both knees, who appears to have CKD 4 as a likely result of interstitial nephropathy due to chronic use of multiple NSAIDs.  It is possible that hypertension has contributed in some fashion to his CKD as well.  I have told him to avoid all NSAIDs including over-the-counter and prescription, and physically discarded his nabumetone.  I have ordered a renal ultrasound to assess kidney size, echogenicity, and rule out obstructive uropathy.  I have ordered labs to include BMP, H&H, PTH, phosphorus, LANE, uric acid, PTH, phosphorus, and urinalysis.  I suspect that proteinuria will be rather scantly, if my impression of interstitial nephropathy is correct.  He will return within 2 to 3 weeks.  I have also added amlodipine 5 mg daily in an effort to bring his BP into better control.  He would like to proceed with knee replacement as quickly as the situation allows safely.

## 2021-07-21 NOTE — HISTORY OF PRESENT ILLNESS
[FreeTextEntry1] : 55-year-old man who was scheduled for total knee replacement by Dr. Panchito Nunez on July 15 -surgery was canceled because his creatinine came back at 2.63, BUN 44, with a GFR of 26-30.  He has been taking a variety of NSAIDs including Aleve, meloxicam, and nabumetone.  He has a history of hypertension and has previously taken labetalol and amlodipine, but is on no antihypertensive meds at this time.  His BP was 145/83 on July 8.  It was higher here today.  He has been using a walker for the last 7 months, and has been out of work for nearly a year.  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  His current degree of pain impacts his sleep.  He reports more frequent urination lately, but also an increase fluid intake.  He has nocturia, but stream size and force are unchanged.  He has not undergone any imaging of the kidneys.  He also has a history of gout, and his last attack was 1 month ago, fairly typical Protegra.  Fortunately, he responds to colchicine without diarrhea.  He also takes allopurinol 100 mg daily.

## 2021-07-22 DIAGNOSIS — E78.5 HYPERLIPIDEMIA, UNSPECIFIED: ICD-10-CM

## 2021-07-22 LAB
25(OH)D3 SERPL-MCNC: 11.5 NG/ML
CHOLEST SERPL-MCNC: 254 MG/DL
ESTIMATED AVERAGE GLUCOSE: 120 MG/DL
HBA1C MFR BLD HPLC: 5.8 %
HCT VFR BLD CALC: 36 %
HDLC SERPL-MCNC: 32 MG/DL
HGB BLD-MCNC: 11.3 G/DL
LDLC SERPL CALC-MCNC: 196 MG/DL
MAGNESIUM SERPL-MCNC: 2.2 MG/DL
NONHDLC SERPL-MCNC: 222 MG/DL
NT-PROBNP SERPL-MCNC: 780 PG/ML
TRIGL SERPL-MCNC: 128 MG/DL
TSH SERPL-ACNC: 1.5 UIU/ML

## 2021-07-22 RX ORDER — ROSUVASTATIN CALCIUM 20 MG/1
20 TABLET, FILM COATED ORAL
Qty: 90 | Refills: 3 | Status: ACTIVE | COMMUNITY
Start: 2021-07-22 | End: 1900-01-01

## 2021-07-26 ENCOUNTER — APPOINTMENT (OUTPATIENT)
Dept: HEART AND VASCULAR | Facility: CLINIC | Age: 56
End: 2021-07-26
Payer: COMMERCIAL

## 2021-07-26 PROCEDURE — 99072 ADDL SUPL MATRL&STAF TM PHE: CPT

## 2021-07-26 PROCEDURE — 93306 TTE W/DOPPLER COMPLETE: CPT

## 2021-07-30 ENCOUNTER — APPOINTMENT (OUTPATIENT)
Dept: ORTHOPEDIC SURGERY | Facility: HOSPITAL | Age: 56
End: 2021-07-30

## 2021-08-09 RX ORDER — HYDROCODONE BITARTRATE AND ACETAMINOPHEN 5; 300 MG/1; MG/1
5-300 TABLET ORAL
Qty: 40 | Refills: 0 | Status: ACTIVE | COMMUNITY
Start: 2020-11-03 | End: 1900-01-01

## 2021-08-11 ENCOUNTER — APPOINTMENT (OUTPATIENT)
Dept: NEPHROLOGY | Facility: CLINIC | Age: 56
End: 2021-08-11

## 2021-08-11 VITALS
SYSTOLIC BLOOD PRESSURE: 155 MMHG | TEMPERATURE: 100 F | HEIGHT: 67 IN | WEIGHT: 179.9 LBS | HEART RATE: 79 BPM | RESPIRATION RATE: 16 BRPM | OXYGEN SATURATION: 100 % | DIASTOLIC BLOOD PRESSURE: 93 MMHG

## 2021-08-11 LAB
ALBUMIN SERPL ELPH-MCNC: 3.8 G/DL — SIGNIFICANT CHANGE UP (ref 3.3–5)
ALBUMIN SERPL ELPH-MCNC: 4 G/DL — SIGNIFICANT CHANGE UP (ref 3.3–5)
ALP SERPL-CCNC: 133 U/L — HIGH (ref 40–120)
ALP SERPL-CCNC: SIGNIFICANT CHANGE UP (ref 40–120)
ALT FLD-CCNC: 35 U/L — SIGNIFICANT CHANGE UP (ref 10–45)
ALT FLD-CCNC: SIGNIFICANT CHANGE UP (ref 10–45)
ANION GAP SERPL CALC-SCNC: 11 MMOL/L — SIGNIFICANT CHANGE UP (ref 5–17)
ANION GAP SERPL CALC-SCNC: 13 MMOL/L — SIGNIFICANT CHANGE UP (ref 5–17)
AST SERPL-CCNC: 35 U/L — SIGNIFICANT CHANGE UP (ref 10–40)
AST SERPL-CCNC: SIGNIFICANT CHANGE UP (ref 10–40)
BASE EXCESS BLDV CALC-SCNC: 1 MMOL/L — SIGNIFICANT CHANGE UP (ref -2–3)
BASOPHILS # BLD AUTO: 0.03 K/UL — SIGNIFICANT CHANGE UP (ref 0–0.2)
BASOPHILS NFR BLD AUTO: 0.3 % — SIGNIFICANT CHANGE UP (ref 0–2)
BILIRUB SERPL-MCNC: 0.6 MG/DL — SIGNIFICANT CHANGE UP (ref 0.2–1.2)
BILIRUB SERPL-MCNC: 0.6 MG/DL — SIGNIFICANT CHANGE UP (ref 0.2–1.2)
BUN SERPL-MCNC: 32 MG/DL — HIGH (ref 7–23)
BUN SERPL-MCNC: 32 MG/DL — HIGH (ref 7–23)
CA-I SERPL-SCNC: 1.3 MMOL/L — SIGNIFICANT CHANGE UP (ref 1.15–1.33)
CALCIUM SERPL-MCNC: 10.2 MG/DL — SIGNIFICANT CHANGE UP (ref 8.4–10.5)
CALCIUM SERPL-MCNC: 10.3 MG/DL — SIGNIFICANT CHANGE UP (ref 8.4–10.5)
CHLORIDE SERPL-SCNC: 100 MMOL/L — SIGNIFICANT CHANGE UP (ref 96–108)
CHLORIDE SERPL-SCNC: 98 MMOL/L — SIGNIFICANT CHANGE UP (ref 96–108)
CO2 BLDV-SCNC: 28.6 MMOL/L — HIGH (ref 22–26)
CO2 SERPL-SCNC: 22 MMOL/L — SIGNIFICANT CHANGE UP (ref 22–31)
CO2 SERPL-SCNC: 25 MMOL/L — SIGNIFICANT CHANGE UP (ref 22–31)
CREAT SERPL-MCNC: 1.96 MG/DL — HIGH (ref 0.5–1.3)
CREAT SERPL-MCNC: 2.09 MG/DL — HIGH (ref 0.5–1.3)
CRP SERPL-MCNC: 168.5 MG/L — HIGH (ref 0–4)
EOSINOPHIL # BLD AUTO: 0.18 K/UL — SIGNIFICANT CHANGE UP (ref 0–0.5)
EOSINOPHIL NFR BLD AUTO: 2 % — SIGNIFICANT CHANGE UP (ref 0–6)
ERYTHROCYTE [SEDIMENTATION RATE] IN BLOOD: 125 MM/HR — HIGH
GAS PNL BLDV: 136 MMOL/L — SIGNIFICANT CHANGE UP (ref 136–145)
GAS PNL BLDV: SIGNIFICANT CHANGE UP
GLUCOSE SERPL-MCNC: 97 MG/DL — SIGNIFICANT CHANGE UP (ref 70–99)
GLUCOSE SERPL-MCNC: 98 MG/DL — SIGNIFICANT CHANGE UP (ref 70–99)
HCO3 BLDV-SCNC: 27 MMOL/L — SIGNIFICANT CHANGE UP (ref 22–29)
HCT VFR BLD CALC: 33 % — LOW (ref 39–50)
HGB BLD-MCNC: 10.6 G/DL — LOW (ref 13–17)
IMM GRANULOCYTES NFR BLD AUTO: 0.2 % — SIGNIFICANT CHANGE UP (ref 0–1.5)
LYMPHOCYTES # BLD AUTO: 1.89 K/UL — SIGNIFICANT CHANGE UP (ref 1–3.3)
LYMPHOCYTES # BLD AUTO: 20.7 % — SIGNIFICANT CHANGE UP (ref 13–44)
MCHC RBC-ENTMCNC: 25.7 PG — LOW (ref 27–34)
MCHC RBC-ENTMCNC: 32.1 GM/DL — SIGNIFICANT CHANGE UP (ref 32–36)
MCV RBC AUTO: 80.1 FL — SIGNIFICANT CHANGE UP (ref 80–100)
MONOCYTES # BLD AUTO: 0.97 K/UL — HIGH (ref 0–0.9)
MONOCYTES NFR BLD AUTO: 10.6 % — SIGNIFICANT CHANGE UP (ref 2–14)
NEUTROPHILS # BLD AUTO: 6.03 K/UL — SIGNIFICANT CHANGE UP (ref 1.8–7.4)
NEUTROPHILS NFR BLD AUTO: 66.2 % — SIGNIFICANT CHANGE UP (ref 43–77)
NRBC # BLD: 0 /100 WBCS — SIGNIFICANT CHANGE UP (ref 0–0)
PCO2 BLDV: 48 MMHG — SIGNIFICANT CHANGE UP (ref 42–55)
PH BLDV: 7.36 — SIGNIFICANT CHANGE UP (ref 7.32–7.43)
PLATELET # BLD AUTO: 306 K/UL — SIGNIFICANT CHANGE UP (ref 150–400)
PO2 BLDV: 24 MMHG — SIGNIFICANT CHANGE UP
POTASSIUM BLDV-SCNC: 4.9 MMOL/L — SIGNIFICANT CHANGE UP (ref 3.5–5.1)
POTASSIUM SERPL-MCNC: 4.6 MMOL/L — SIGNIFICANT CHANGE UP (ref 3.5–5.3)
POTASSIUM SERPL-MCNC: SIGNIFICANT CHANGE UP (ref 3.5–5.3)
POTASSIUM SERPL-SCNC: 4.6 MMOL/L — SIGNIFICANT CHANGE UP (ref 3.5–5.3)
POTASSIUM SERPL-SCNC: SIGNIFICANT CHANGE UP (ref 3.5–5.3)
PROT SERPL-MCNC: 9 G/DL — HIGH (ref 6–8.3)
PROT SERPL-MCNC: 9.1 G/DL — HIGH (ref 6–8.3)
RBC # BLD: 4.12 M/UL — LOW (ref 4.2–5.8)
RBC # FLD: 14.1 % — SIGNIFICANT CHANGE UP (ref 10.3–14.5)
SAO2 % BLDV: 38.4 % — SIGNIFICANT CHANGE UP
SODIUM SERPL-SCNC: 133 MMOL/L — LOW (ref 135–145)
SODIUM SERPL-SCNC: 136 MMOL/L — SIGNIFICANT CHANGE UP (ref 135–145)
WBC # BLD: 9.12 K/UL — SIGNIFICANT CHANGE UP (ref 3.8–10.5)
WBC # FLD AUTO: 9.12 K/UL — SIGNIFICANT CHANGE UP (ref 3.8–10.5)

## 2021-08-11 PROCEDURE — 73564 X-RAY EXAM KNEE 4 OR MORE: CPT | Mod: 26,50

## 2021-08-11 PROCEDURE — 99285 EMERGENCY DEPT VISIT HI MDM: CPT | Mod: 25

## 2021-08-11 RX ORDER — MORPHINE SULFATE 50 MG/1
4 CAPSULE, EXTENDED RELEASE ORAL ONCE
Refills: 0 | Status: DISCONTINUED | OUTPATIENT
Start: 2021-08-11 | End: 2021-08-11

## 2021-08-11 RX ORDER — OXYCODONE AND ACETAMINOPHEN 5; 325 MG/1; MG/1
1 TABLET ORAL ONCE
Refills: 0 | Status: DISCONTINUED | OUTPATIENT
Start: 2021-08-11 | End: 2021-08-11

## 2021-08-11 RX ADMIN — MORPHINE SULFATE 4 MILLIGRAM(S): 50 CAPSULE, EXTENDED RELEASE ORAL at 23:33

## 2021-08-11 RX ADMIN — OXYCODONE AND ACETAMINOPHEN 1 TABLET(S): 5; 325 TABLET ORAL at 18:28

## 2021-08-11 RX ADMIN — OXYCODONE AND ACETAMINOPHEN 1 TABLET(S): 5; 325 TABLET ORAL at 23:33

## 2021-08-11 NOTE — ED ADULT NURSE NOTE - NS ED NURSE LEVEL OF CONSCIOUSNESS SPEECH
Spoke with Jasmin and patient- he v/u that need to hold off on stress test until on blood thinner for 24hours. Confirmed timing with APN. Pt v/u. He will start eliquis and then reschedule stress test.   Speaking Coherently

## 2021-08-11 NOTE — ED PROVIDER NOTE - OBJECTIVE STATEMENT
55 M pmh htn, CKD, anemia, 55 M pmh htn, CKD, anemia, arthritis p/w b/l knee pain x one year, worse the past 3 days.  Pt reports he is supposed to have b/l knee replacements with Dr. Nunez but surgery delayed due to need for clearance from nephrology and cardiology.  States he has been taking percocet for a long time but ran out about one week ago.  Pt has worsening pain in b/l knees over past 3 days and unable to ambulate.  States he had to call ambulance today because he couldn't get out of bed 2/2 pain. pt had prior surgery L knee for ruptured patella tendon.  Denies f/c, headache, dizziness, chest pain, sob, nv, numbness/weakness, skin changes, fall/trauma.

## 2021-08-11 NOTE — ED PROVIDER NOTE - PROGRESS NOTE DETAILS
synovial fluid analysis c/s crystal-induced arthropathy, not septic arthritis.  pt able to stand but unable to take a step, pain level is 4.  lives alone and feels unsafe.  will admit for pain control and PT eval.  plan is eventual total knee arthroplasty with Dr Nunez. synovial fluid analysis c/s crystal-induced arthropathy, not septic arthritis.  pt able to stand only with significant assistance, but unable to take a step, pain level is 4.  lives with son, but is alone most of the day, and he feels unsafe.  will admit for pain control and PT eval.  plan is eventual total knee arthroplasty with Dr Nunez.

## 2021-08-11 NOTE — ED ADULT NURSE NOTE - OBJECTIVE STATEMENT
Pt presents to ED by EMS C/O bilateral knee pain and swelling worsening in the L knee x 2 days. Pt has hx of L knee surgery with scar, walks with walker at baseline. Denies N/V/D, fevers. Made comfortable, RN continuing to monitor.

## 2021-08-11 NOTE — ED PROVIDER NOTE - SHIFT CHANGE DETAILS
HTN, CKD, arthritis c/o bilat knee pain x 1 yr, worse past few days.  was scheduled for surgery last month but was not medically cleared. percocet at home until 1 week ago, now pain severe and unable to walk even with his walker.  unable to walk in ED.  has bilat knee effusions on exam, no warmth/erythema, low susp for septic arthritis.  pain treated, XR arthritic changes.  seen by ortho, want inflammatory markers.  likely DC home if able to ambulate to follow up with Dr Nunez.  if unable to walk admit for pain control and PT eval.

## 2021-08-11 NOTE — ED ADULT TRIAGE NOTE - CHIEF COMPLAINT QUOTE
PT BIBA from home for c/o of bilateral knee pain x 1 year. PT states has worsened over the past week.

## 2021-08-11 NOTE — ED ADULT NURSE NOTE - CAS EDN DISCHARGE INTERVENTIONS
SW NOTE:  spoke to Melvi SAENZ at [place] and obtained authorization for inpatient psych transfer to [facility] for 4 days (12/6/18 - 12/9/18). Melvi could not give authorization number at this time. To refer back to this case, caller should give pt's insurance policy number: [id number]. Concurrent review is due on 12/10/18 and  is Pawan CADENA ([phone]. [phone]).  spoke to Maya at [facility] for transport. Pt to be transported tonight to [facility]. Pt verbalized consent for worker to inform motherBrianna on this transfer. IV intact

## 2021-08-11 NOTE — ED PROVIDER NOTE - PHYSICAL EXAMINATION
Vitals reviewed  Gen: pt lying comfortably on bed, nad, speaking in full sentences  Skin: wwp, no rash/lesions, no erythema or warmth  HEENT: ncat, eomi, mmm  CV: rrr, no audible m/r/g  Resp: symmetrical expansion, ctab, no w/r/r  Abd: nondistended, soft/nt  Ext: b/l knees w/ flexion to approx 90 degrees limited by pain,  + b/l knee effusions- no overlying erythema or warmth, + vertical scar L knee, FROM all other joint, no peripheral edema/calf ttp, SILT, distal pulses 2+  Neuro: alert/oriented, no focal deficits

## 2021-08-11 NOTE — ED ADULT NURSE NOTE - NSIMPLEMENTINTERV_GEN_ALL_ED
Implemented All Fall Risk Interventions:  [place] to call system. Call bell, personal items and telephone within reach. Instruct patient to call for assistance. Room bathroom lighting operational. Non-slip footwear when patient is off stretcher. Physically safe environment: no spills, clutter or unnecessary equipment. Stretcher in lowest position, wheels locked, appropriate side rails in place. Provide visual cue, wrist band, yellow gown, etc. Monitor gait and stability. Monitor for mental status changes and reorient to person, place, and time. Review medications for side effects contributing to fall risk. Reinforce activity limits and safety measures with patient and family.

## 2021-08-11 NOTE — ED PROVIDER NOTE - CLINICAL SUMMARY MEDICAL DECISION MAKING FREE TEXT BOX
55 M pmh htn, CKD, anemia, arthritis p/w b/l knee pain x one year, worse the past 3 days- no new trauma.  pt lying comfortably on bed, Ext: b/l knees w/ flexion to approx 90 degrees limited by pain,  + b/l knee effusions- no overlying erythema or warmth, + vertical scar L knee, FROM all other joint, no peripheral edema/calf ttp, SILT, distal pulses 2+.  pt unable to ambulate.  likely 2/2 severe arthritis, no signs of septic joint and no new injuries.  will give percocet and obtain xrays,  c/s ortho

## 2021-08-12 ENCOUNTER — INPATIENT (INPATIENT)
Facility: HOSPITAL | Age: 56
LOS: 4 days | Discharge: NURSING, REHAB OR LONG-TERM CARE | DRG: 554 | End: 2021-08-17
Attending: STUDENT IN AN ORGANIZED HEALTH CARE EDUCATION/TRAINING PROGRAM | Admitting: STUDENT IN AN ORGANIZED HEALTH CARE EDUCATION/TRAINING PROGRAM
Payer: COMMERCIAL

## 2021-08-12 DIAGNOSIS — N18.6 END STAGE RENAL DISEASE: ICD-10-CM

## 2021-08-12 DIAGNOSIS — M17.0 BILATERAL PRIMARY OSTEOARTHRITIS OF KNEE: ICD-10-CM

## 2021-08-12 DIAGNOSIS — Z29.9 ENCOUNTER FOR PROPHYLACTIC MEASURES, UNSPECIFIED: ICD-10-CM

## 2021-08-12 LAB
ANION GAP SERPL CALC-SCNC: 10 MMOL/L — SIGNIFICANT CHANGE UP (ref 5–17)
BUN SERPL-MCNC: 42 MG/DL — HIGH (ref 7–23)
CALCIUM SERPL-MCNC: 9.6 MG/DL — SIGNIFICANT CHANGE UP (ref 8.4–10.5)
CHLORIDE SERPL-SCNC: 100 MMOL/L — SIGNIFICANT CHANGE UP (ref 96–108)
CO2 SERPL-SCNC: 22 MMOL/L — SIGNIFICANT CHANGE UP (ref 22–31)
CREAT SERPL-MCNC: 2.34 MG/DL — HIGH (ref 0.5–1.3)
GLUCOSE SERPL-MCNC: 177 MG/DL — HIGH (ref 70–99)
HCT VFR BLD CALC: 28.2 % — LOW (ref 39–50)
HGB BLD-MCNC: 8.9 G/DL — LOW (ref 13–17)
MAGNESIUM SERPL-MCNC: 2.2 MG/DL — SIGNIFICANT CHANGE UP (ref 1.6–2.6)
MCHC RBC-ENTMCNC: 25.8 PG — LOW (ref 27–34)
MCHC RBC-ENTMCNC: 31.6 GM/DL — LOW (ref 32–36)
MCV RBC AUTO: 81.7 FL — SIGNIFICANT CHANGE UP (ref 80–100)
NRBC # BLD: 0 /100 WBCS — SIGNIFICANT CHANGE UP (ref 0–0)
PHOSPHATE SERPL-MCNC: 4.4 MG/DL — SIGNIFICANT CHANGE UP (ref 2.5–4.5)
PLATELET # BLD AUTO: 268 K/UL — SIGNIFICANT CHANGE UP (ref 150–400)
POTASSIUM SERPL-MCNC: 4.4 MMOL/L — SIGNIFICANT CHANGE UP (ref 3.5–5.3)
POTASSIUM SERPL-SCNC: 4.4 MMOL/L — SIGNIFICANT CHANGE UP (ref 3.5–5.3)
RBC # BLD: 3.45 M/UL — LOW (ref 4.2–5.8)
RBC # FLD: 14.2 % — SIGNIFICANT CHANGE UP (ref 10.3–14.5)
SARS-COV-2 RNA SPEC QL NAA+PROBE: SIGNIFICANT CHANGE UP
SODIUM SERPL-SCNC: 132 MMOL/L — LOW (ref 135–145)
WBC # BLD: 8.49 K/UL — SIGNIFICANT CHANGE UP (ref 3.8–10.5)
WBC # FLD AUTO: 8.49 K/UL — SIGNIFICANT CHANGE UP (ref 3.8–10.5)

## 2021-08-12 PROCEDURE — 99223 1ST HOSP IP/OBS HIGH 75: CPT | Mod: GC

## 2021-08-12 PROCEDURE — 99222 1ST HOSP IP/OBS MODERATE 55: CPT

## 2021-08-12 RX ORDER — HYDROMORPHONE HYDROCHLORIDE 2 MG/ML
0.5 INJECTION INTRAMUSCULAR; INTRAVENOUS; SUBCUTANEOUS EVERY 4 HOURS
Refills: 0 | Status: DISCONTINUED | OUTPATIENT
Start: 2021-08-12 | End: 2021-08-12

## 2021-08-12 RX ORDER — ALLOPURINOL 300 MG
200 TABLET ORAL DAILY
Refills: 0 | Status: DISCONTINUED | OUTPATIENT
Start: 2021-08-12 | End: 2021-08-15

## 2021-08-12 RX ORDER — AMLODIPINE BESYLATE 2.5 MG/1
1 TABLET ORAL
Qty: 0 | Refills: 0 | DISCHARGE

## 2021-08-12 RX ORDER — OXYCODONE HYDROCHLORIDE 5 MG/1
10 TABLET ORAL EVERY 4 HOURS
Refills: 0 | Status: DISCONTINUED | OUTPATIENT
Start: 2021-08-12 | End: 2021-08-12

## 2021-08-12 RX ORDER — ACETAMINOPHEN 500 MG
650 TABLET ORAL EVERY 6 HOURS
Refills: 0 | Status: DISCONTINUED | OUTPATIENT
Start: 2021-08-12 | End: 2021-08-12

## 2021-08-12 RX ORDER — ACETAMINOPHEN 500 MG
975 TABLET ORAL EVERY 8 HOURS
Refills: 0 | Status: DISCONTINUED | OUTPATIENT
Start: 2021-08-12 | End: 2021-08-17

## 2021-08-12 RX ORDER — ATORVASTATIN CALCIUM 80 MG/1
80 TABLET, FILM COATED ORAL AT BEDTIME
Refills: 0 | Status: DISCONTINUED | OUTPATIENT
Start: 2021-08-12 | End: 2021-08-17

## 2021-08-12 RX ORDER — ROSUVASTATIN CALCIUM 5 MG/1
1 TABLET ORAL
Qty: 0 | Refills: 0 | DISCHARGE

## 2021-08-12 RX ORDER — ALLOPURINOL 300 MG
50 TABLET ORAL EVERY 24 HOURS
Refills: 0 | Status: DISCONTINUED | OUTPATIENT
Start: 2021-08-12 | End: 2021-08-12

## 2021-08-12 RX ORDER — OXYCODONE HYDROCHLORIDE 5 MG/1
5 TABLET ORAL EVERY 4 HOURS
Refills: 0 | Status: DISCONTINUED | OUTPATIENT
Start: 2021-08-12 | End: 2021-08-12

## 2021-08-12 RX ORDER — HEPARIN SODIUM 5000 [USP'U]/ML
5000 INJECTION INTRAVENOUS; SUBCUTANEOUS EVERY 8 HOURS
Refills: 0 | Status: DISCONTINUED | OUTPATIENT
Start: 2021-08-13 | End: 2021-08-17

## 2021-08-12 RX ORDER — SENNA PLUS 8.6 MG/1
2 TABLET ORAL AT BEDTIME
Refills: 0 | Status: DISCONTINUED | OUTPATIENT
Start: 2021-08-12 | End: 2021-08-17

## 2021-08-12 RX ORDER — AMLODIPINE BESYLATE 2.5 MG/1
5 TABLET ORAL DAILY
Refills: 0 | Status: DISCONTINUED | OUTPATIENT
Start: 2021-08-12 | End: 2021-08-17

## 2021-08-12 RX ORDER — ALLOPURINOL 300 MG
0 TABLET ORAL
Qty: 0 | Refills: 0 | DISCHARGE

## 2021-08-12 RX ORDER — AMLODIPINE BESYLATE 2.5 MG/1
10 TABLET ORAL DAILY
Refills: 0 | Status: DISCONTINUED | OUTPATIENT
Start: 2021-08-12 | End: 2021-08-12

## 2021-08-12 RX ORDER — OXYCODONE HYDROCHLORIDE 5 MG/1
5 TABLET ORAL EVERY 6 HOURS
Refills: 0 | Status: DISCONTINUED | OUTPATIENT
Start: 2021-08-12 | End: 2021-08-12

## 2021-08-12 RX ORDER — HYDROMORPHONE HYDROCHLORIDE 2 MG/ML
2 INJECTION INTRAMUSCULAR; INTRAVENOUS; SUBCUTANEOUS EVERY 8 HOURS
Refills: 0 | Status: DISCONTINUED | OUTPATIENT
Start: 2021-08-12 | End: 2021-08-17

## 2021-08-12 RX ORDER — POLYETHYLENE GLYCOL 3350 17 G/17G
17 POWDER, FOR SOLUTION ORAL DAILY
Refills: 0 | Status: DISCONTINUED | OUTPATIENT
Start: 2021-08-12 | End: 2021-08-17

## 2021-08-12 RX ORDER — ENOXAPARIN SODIUM 100 MG/ML
40 INJECTION SUBCUTANEOUS EVERY 24 HOURS
Refills: 0 | Status: DISCONTINUED | OUTPATIENT
Start: 2021-08-12 | End: 2021-08-12

## 2021-08-12 RX ADMIN — SENNA PLUS 2 TABLET(S): 8.6 TABLET ORAL at 21:39

## 2021-08-12 RX ADMIN — OXYCODONE HYDROCHLORIDE 10 MILLIGRAM(S): 5 TABLET ORAL at 06:52

## 2021-08-12 RX ADMIN — HYDROMORPHONE HYDROCHLORIDE 2 MILLIGRAM(S): 2 INJECTION INTRAMUSCULAR; INTRAVENOUS; SUBCUTANEOUS at 14:33

## 2021-08-12 RX ADMIN — Medication 40 MILLIGRAM(S): at 14:03

## 2021-08-12 RX ADMIN — OXYCODONE HYDROCHLORIDE 10 MILLIGRAM(S): 5 TABLET ORAL at 07:50

## 2021-08-12 RX ADMIN — ATORVASTATIN CALCIUM 80 MILLIGRAM(S): 80 TABLET, FILM COATED ORAL at 21:38

## 2021-08-12 RX ADMIN — AMLODIPINE BESYLATE 10 MILLIGRAM(S): 2.5 TABLET ORAL at 10:25

## 2021-08-12 RX ADMIN — ENOXAPARIN SODIUM 40 MILLIGRAM(S): 100 INJECTION SUBCUTANEOUS at 10:25

## 2021-08-12 RX ADMIN — HYDROMORPHONE HYDROCHLORIDE 2 MILLIGRAM(S): 2 INJECTION INTRAMUSCULAR; INTRAVENOUS; SUBCUTANEOUS at 14:03

## 2021-08-12 RX ADMIN — Medication 50 MILLIGRAM(S): at 10:25

## 2021-08-12 NOTE — H&P ADULT - ATTENDING COMMENTS
Agree with above history and physical. Adjustments made in above plan.     1. Acute Gout flare - prednisone 40mg  2. Severe OA - pain control, outpatient follow up, PT

## 2021-08-12 NOTE — H&P ADULT - PROBLEM SELECTOR PLAN 2
hx of CKD w/ creatinine 2.09 (baseline 1.96)  -trend creatinine, avoid nephrotic agents (NSAIDS or colchicine)

## 2021-08-12 NOTE — PHYSICAL THERAPY INITIAL EVALUATION ADULT - PERTINENT HX OF CURRENT PROBLEM, REHAB EVAL
54 yo M PMH HTN, CKD, anemia, arthritis p/w b/l knee pain. Pt says his pain has gotton worse the last couple of weeks. Usually uses walker to ambulate however cannot even get out of bed anymore. Says he tried to see his orthopedic Dr. Nunez about b/l knee replacements however he needs clearance from nephrology and cardiology. Denies fever, chills, chest pain, sob. Usually has gout flare only in his big toe. Compliant with his allopurinol, does not know his other homes meds.

## 2021-08-12 NOTE — CONSULT NOTE ADULT - ATTENDING SUPERVISION STATEMENT
Patient Education     When Your Child Has Pharyngitis or Tonsillitis    Your child’s throat feels sore. This is likely because of redness and swelling (inflammation) of the throat. Two areas of the throat are most often affected: the pharynx and tonsils. Inflammation of the pharynx (pharyngitis) and inflammation of the tonsils (tonsillitis) are very common in children. This sheet tells you what you can do to relieve your child’s throat pain.  What causes pharyngitis or tonsillitis?  Most commonly, pharyngitis and tonsillitis are caused by a viral or bacterial infection.  What are the symptoms of pharyngitis or tonsillitis?  The main symptom of both conditions is a sore throat. Your child may also have a fever, redness or swelling of the throat, and trouble swallowing. You may feel lumps in the neck.  How is pharyngitis or tonsillitis diagnosed?  The healthcare provider will examine your child’s throat. The healthcare provider might wipe (swab) your child’s throat. This swab will be tested for the bacteria that causes an infection called strep throat. If needed, a blood test can be done to check for a viral infection such as mononucleosis.  How is pharyngitis or tonsillitis treated?  If your child’s sore throat is caused by a bacterial infection, the healthcare provider may prescribe antibiotics. Otherwise, you can treat your child’s sore throat at home. To do this:  · Give your child acetaminophen or ibuprofen to ease the pain. Don't use ibuprofen in children younger than 6 months of age or in children who are dehydrated or vomiting all of the time. Don’t give your child aspirin to relieve a fever. Using aspirin to treat a fever in children could cause a serious condition called Reye syndrome.  · Give your child cool liquids to drink.  · Have your child gargle with warm saltwater if it helps relieve pain. An over-the-counter throat numbing spray may also help.  What are the long-term concerns?  If your child has  frequent sore throats, take him or her to see a healthcare provider. Removing the tonsils may help relieve your child’s recurring problems.  When to call your child's healthcare provider  Call your child’s healthcare provider right away if your otherwise healthy child has any of the following:  · Fever (see Fever and children, below)  · Sore throat pain that persists for 2 to 3 days  · Sore throat with fever, headache, stomachache, or rash  · Trouble turning or straightening the head  · Problems swallowing or drooling  · Trouble breathing or needing to lean forward to breathe  · Problems opening mouth fully     Fever and children  Always use a digital thermometer to check your child’s temperature. Never use a mercury thermometer.  For infants and toddlers, be sure to use a rectal thermometer correctly. A rectal thermometer may accidentally poke a hole in (perforate) the rectum. It may also pass on germs from the stool. Always follow the product maker’s directions for proper use. If you don’t feel comfortable taking a rectal temperature, use another method. When you talk to your child’s healthcare provider, tell him or her which method you used to take your child’s temperature.  Here are guidelines for fever temperature. Ear temperatures aren’t accurate before 6 months of age. Don’t take an oral temperature until your child is at least 4 years old.  Infant under 3 months old:  · Ask your child’s healthcare provider how you should take the temperature.  · Rectal or forehead (temporal artery) temperature of 100.4°F (38°C) or higher, or as directed by the provider  · Armpit temperature of 99°F (37.2°C) or higher, or as directed by the provider  Child age 3 to 36 months:  · Rectal, forehead (temporal artery), or ear temperature of 102°F (38.9°C) or higher, or as directed by the provider  · Armpit temperature of 101°F (38.3°C) or higher, or as directed by the provider  Child of any age:  · Repeated temperature of 104°F  (40°C) or higher, or as directed by the provider  · Fever that lasts more than 24 hours in a child under 2 years old. Or a fever that lasts for 3 days in a child 2 years or older.   Date Last Reviewed: 11/1/2016  © 2388-0352 [employer] [address] [place] [address]. All rights reserved. This information is not intended as a substitute for professional medical care. Always follow your healthcare professional's instructions.            Resident

## 2021-08-12 NOTE — H&P ADULT - HISTORY OF PRESENT ILLNESS
55 M pmh htn, CKD, anemia, arthritis p/w b/l knee pain x one year, worse the past 3 days.  Pt reports he is supposed to have b/l knee replacements with Dr. Nunez but surgery delayed due to need for clearance from nephrology and cardiology.  States he has been taking percocet for a long time but ran out about one week ago.  Pt has worsening pain in b/l knees over past 3 days and unable to ambulate.  States he had to call ambulance today because he couldn't get out of bed 2/2 pain. pt had prior surgery L knee for ruptured patella tendon.  Denies f/c, headache, dizziness, chest pain, sob, nv, numbness/weakness, skin changes, fall/trauma.    ED Vitals: afebrile, HR 79, 155/93, RR 16, SPO2 100 room air  ED Labs: , No leukocytosis, Hgb 10.6, creatinine 2.09 (baseline 1.96),   ED Imagin. Knee X rays: supra-patellar effusions w/ OA    ED Course: Ortho consulted for concern for septic joints - tap: bloody w/ crystals, no infection 54 yo M PMH HTN, CKD, anemia, arthritis p/w b/l knee pain. Pt says his pain has gotton worse the last couple of weeks. Usually uses walker to ambulate however cannot even get out of bed anymore. Says he tried to see his orthopedic Dr. Nunez about b/l knee replacements however he needs clearance from nephrology and cardiology. Denies fever, chills, chest pain, sob. Usually has gout flare only in his big toe. Compliant with his allopurinol, does not know his other homes meds.     ED Vitals: afebrile, HR 79, 155/93, RR 16, SPO2 100 room air  ED Labs: , No leukocytosis, Hgb 10.6, creatinine 2.09 (baseline 1.96),   ED Imagin. Knee X rays: supra-patellar effusions w/ OA    ED Course: Ortho consulted for concern for septic joints - tap: bloody w/ crystals, no infection

## 2021-08-12 NOTE — H&P ADULT - PROBLEM SELECTOR PLAN 3
F: None   E: Replete for K<4 Mag<2  N: Regular Diet  A: lovenox  Code status: Full  Disposition: [facility]

## 2021-08-12 NOTE — CONSULT NOTE ADULT - SUBJECTIVE AND OBJECTIVE BOX
Orthopaedic Surgery Consult Note    Attending Physician:  Consult requested by:     CC:     HPI:  55yM with HTN, CKD (creatinine 2.09), severe b/l OA who presents with severe b/l knee pain. Patient was scheduled for b/l TKAs with Oh on July 11, but surgery delayed due to failing cardiology and nephrology clearances. States he has been taking percocet for a long time but ran out about one week ago.  Pt reports worsening pain in b/l knees over past 3 days and unable to ambulate. Otherwise, patient denies recent trauma, numbness or weakness.    Allergies    No Known Allergies    Intolerances      PAST MEDICAL & SURGICAL HISTORY:      Meds:  acetaminophen    Suspension .. 650 milliGRAM(s) Oral every 6 hours PRN  acetaminophen   Tablet .. 650 milliGRAM(s) Oral every 6 hours PRN  HYDROmorphone  Injectable 0.5 milliGRAM(s) IV Push every 4 hours PRN  oxyCODONE    IR 5 milliGRAM(s) Oral every 4 hours PRN  oxyCODONE    IR 10 milliGRAM(s) Oral every 4 hours PRN      Family History:  Denies family history of bleeding disorders    Social History:   Pt is a nonsmoker  Social EtOH use     Review of Systems:  All review of systems negative except for those stated in HPI.    Physical Exam:   Bilateral knees mild swollen and ttp along medial and lateral joint lines.  Passively ranging R knee 0-90 and left knee 0-100  2+ pedal pulses in b/l LE warm and well perfused  SILT in b/l LE  EHL/FHL/ TA/ GC firing      Vital Signs Last 24 Hrs  T(C): 37.3 (12 Aug 2021 04:58), Max: 37.7 (11 Aug 2021 17:40)  T(F): 99.1 (12 Aug 2021 04:58), Max: 99.8 (11 Aug 2021 17:40)  HR: 77 (12 Aug 2021 04:58) (77 - 79)  BP: 131/78 (12 Aug 2021 04:58) (131/78 - 158/89)  BP(mean): --  RR: 17 (12 Aug 2021 04:58) (15 - 17)  SpO2: 100% (12 Aug 2021 04:58) (99% - 100%)      Labs:                        10.6   9.12  )-----------( 306      ( 11 Aug 2021 21:03 )             33.0     08-11    136  |  100  |  32<H>  ----------------------------<  97  4.6   |  25  |  2.09<H>    Ca    10.3      11 Aug 2021 23:13    TPro  9.1<H>  /  Alb  4.0  /  TBili  0.6  /  DBili  x   /  AST  35  /  ALT  35  /  AlkPhos  133<H>  08-11    Knee aspiration:  Right knee  Cell count:1601  RBC: 272,000  Crystals: neg  Left knee  Cell count: 14,630  RBC: 3000  Crystals: yellow, monosodium urate crystals    Imaging:   Xray Knees b/l (AP, Lateral, [place]) - bilateral moderate tricompartmental osteoarthritic changes    A/P: 55yM with HTN, CKD (creatinine 2.09), severe b/l OA who presents who acute flare of bilateral osteoarthritis.  -stable  -pain control  -bilateral knees were aspirated, negative cell count, L knee +uric acid  -do not give NSAIDS or colchicine given CKD  -admitted to medicine for PT/observation as patient was not safe for discharge since he is unable to ambulate due to pain  -f/u with Dr. Nunez as outpatient  -PT, WBS; WBAT    - Discussed with Attending, Dr. Rosas Pager [phone]

## 2021-08-12 NOTE — PATIENT PROFILE ADULT - MONEY FOR FOOD
no
Pulse with normal variation, frequency and intensity (amplitude & strength) with equal intensity on upper and lower extremities/PMI and heart sounds localize heart on left side of chest/Murmurs absent

## 2021-08-12 NOTE — PHYSICAL THERAPY INITIAL EVALUATION ADULT - IMPAIRMENTS FOUND, PT EVAL
aerobic capacity/endurance/gait, locomotion, and balance/integumentary integrity/joint integrity and mobility/muscle strength/poor safety awareness/posture

## 2021-08-12 NOTE — H&P ADULT - PROBLEM SELECTOR PLAN 1
Severe b/l OA 2/2 repeated trauma from exercise. Pain has gotton worse the last couple of weeks, pt cannot ambulate. Concern for septic joint in ED, tap by ortho showing blood w/ crystals, no infection. Admitted as unable to ambulate  -PT recs  -ortho recs regarding b/l knee replacements  -pain control    #hx Gout: not in active flare, takes allopurinol at home  -med rec dose - ordered allpurinol 50mg qd - based on gfr 40 Severe b/l OA 2/2 repeated trauma from exercise. Pain has gotton worse the last couple of weeks, pt cannot ambulate. Concern for septic joint in ED, tap by ortho showing blood w/ crystals, no infection. Admitted as unable to ambulate  -PT recs  -ortho recs regarding b/l knee replacements  -pain control - Voltaren gel for outpatient use    #Gout in Left Knee: 10k WBC + uric acid, physical exam with erythema, would treat for Gout flare of knee as precipitating cause of worsening pain.  -Start Prednisone 40mg Qd   -med rec dose - ordered allpurinol 50mg qd - based on gfr 40

## 2021-08-12 NOTE — ED ADULT NURSE REASSESSMENT NOTE - NS ED NURSE REASSESS COMMENT FT1
Pt reports pain level 4 0ut of 10 at this time. Pt able to stand but reports not able to make a step nor ambulate. Per pt "I do not feel safe going home". DIMITRI Gross made aware. Pending admit per provider's order.

## 2021-08-12 NOTE — H&P ADULT - NSHPLABSRESULTS_GEN_ALL_CORE
.  LABS:                         10.6   9.12  )-----------( 306      ( 11 Aug 2021 21:03 )             33.0     08-11    136  |  100  |  32<H>  ----------------------------<  97  4.6   |  25  |  2.09<H>    Ca    10.3      11 Aug 2021 23:13    TPro  9.1<H>  /  Alb  4.0  /  TBili  0.6  /  DBili  x   /  AST  35  /  ALT  35  /  AlkPhos  133<H>  08-11                  RADIOLOGY, EKG & ADDITIONAL TESTS: Reviewed.

## 2021-08-12 NOTE — H&P ADULT - NSHPPHYSICALEXAM_GEN_ALL_CORE
PHYSICAL EXAM:    GENERAL: NAD, on room air, pleasant  HEAD: NC/AT  EYES: EOMI, no scleral icterus  HEENT: MMM  NECK: Supple, No JVD  LUNG: CTAB b/l, no wheezing or crackles  HEART: RRR, +s1 +s2  ABDOMEN: normal BS, no pain w/ deep palpation in all quadrants  EXTREMITIES: b/l knee swelling and warmth, no signs of purulence or erythema, limited ROM knee's 2/2 pain  VASCULAR: 2+ radial pulse   CNS: A+O x 3

## 2021-08-12 NOTE — PROGRESS NOTE ADULT - SUBJECTIVE AND OBJECTIVE BOX
Ortho Note    Pt comfortable without complaints, pain controlled  Denies CP, SOB, N/V, numbness/tingling     Vital Signs Last 24 Hrs  T(C): 37 (08-12-21 @ 08:20), Max: 37.3 (08-12-21 @ 04:58)  T(F): 98.6 (08-12-21 @ 08:20), Max: 99.1 (08-12-21 @ 04:58)  HR: 81 (08-12-21 @ 08:20) (77 - 81)  BP: 145/81 (08-12-21 @ 08:20) (131/78 - 145/81)  BP(mean): --  RR: 17 (08-12-21 @ 08:20) (17 - 17)  SpO2: 99% (08-12-21 @ 08:20) (99% - 100%)  I&O's Summary      Physical Exam:  General: Pt Alert and oriented, NAD  R knee ranging 0-90 L knee ranging from 0-100  DSG C/D/I  Pulses: 2+ pedal pulses warm and well perfused bilaterally  Sensation: SILT in b/l LE  Motor: 5/5 EHL/FHL/TA/GS b/l                          10.6   9.12  )-----------( 306      ( 11 Aug 2021 21:03 )             33.0     08-11    136  |  100  |  32<H>  ----------------------------<  97  4.6   |  25  |  2.09<H>    Ca    10.3      11 Aug 2021 23:13    TPro  9.1<H>  /  Alb  4.0  /  TBili  0.6  /  DBili  x   /  AST  35  /  ALT  35  /  AlkPhos  133<H>  08-11      A/P: 55yMale with HTN, CKD, Gout who presents with acute OA flare of bilateral knees  - Stable  - Pain Control  - no acute ortho intervention at this time, patient plans to undergo bilateral TKA once medically cleared  - PT, WBS: WBAT     Ortho Pager [phone]

## 2021-08-12 NOTE — CONSULT NOTE ADULT - ATTENDING COMMENTS
Patient known to me for bilateral knee osteoarthritis and flexion contractures, now admitted for acute exacerbation with concurrent gout flare. TKA had been previously scheduled but postponed due to ongoing medical clearance workup. This included cardiac workup from Dr. Browne (echo was done, coronary CTA still outstanding) and renal workup from Dr. Dover (renal US for CKD). Until these are done and appropriate clearances received, I would not consider him a candidate for elective knee surgery. Neither knee is infected and there is no indication for emergency surgery. The remaining workup can be done as outpatient in the coming weeks. Agree with disposition to rehab given his functional debilitation.

## 2021-08-13 DIAGNOSIS — M10.9 GOUT, UNSPECIFIED: ICD-10-CM

## 2021-08-13 DIAGNOSIS — R74.8 ABNORMAL LEVELS OF OTHER SERUM ENZYMES: ICD-10-CM

## 2021-08-13 DIAGNOSIS — N18.30 CHRONIC KIDNEY DISEASE, STAGE 3 UNSPECIFIED: ICD-10-CM

## 2021-08-13 DIAGNOSIS — I25.10 ATHEROSCLEROTIC HEART DISEASE OF NATIVE CORONARY ARTERY WITHOUT ANGINA PECTORIS: ICD-10-CM

## 2021-08-13 DIAGNOSIS — I10 ESSENTIAL (PRIMARY) HYPERTENSION: ICD-10-CM

## 2021-08-13 LAB
ALBUMIN SERPL ELPH-MCNC: 3.3 G/DL — SIGNIFICANT CHANGE UP (ref 3.3–5)
ALP SERPL-CCNC: 144 U/L — HIGH (ref 40–120)
ALT FLD-CCNC: 41 U/L — SIGNIFICANT CHANGE UP (ref 10–45)
ANION GAP SERPL CALC-SCNC: 10 MMOL/L — SIGNIFICANT CHANGE UP (ref 5–17)
AST SERPL-CCNC: 38 U/L — SIGNIFICANT CHANGE UP (ref 10–40)
BASOPHILS # BLD AUTO: 0.01 K/UL — SIGNIFICANT CHANGE UP (ref 0–0.2)
BASOPHILS NFR BLD AUTO: 0.1 % — SIGNIFICANT CHANGE UP (ref 0–2)
BILIRUB SERPL-MCNC: 0.3 MG/DL — SIGNIFICANT CHANGE UP (ref 0.2–1.2)
BUN SERPL-MCNC: 39 MG/DL — HIGH (ref 7–23)
CALCIUM SERPL-MCNC: 9.6 MG/DL — SIGNIFICANT CHANGE UP (ref 8.4–10.5)
CHLORIDE SERPL-SCNC: 102 MMOL/L — SIGNIFICANT CHANGE UP (ref 96–108)
CO2 SERPL-SCNC: 23 MMOL/L — SIGNIFICANT CHANGE UP (ref 22–31)
COVID-19 SPIKE DOMAIN AB INTERP: POSITIVE
COVID-19 SPIKE DOMAIN ANTIBODY RESULT: 33.7 U/ML — HIGH
CREAT SERPL-MCNC: 1.89 MG/DL — HIGH (ref 0.5–1.3)
EOSINOPHIL # BLD AUTO: 0 K/UL — SIGNIFICANT CHANGE UP (ref 0–0.5)
EOSINOPHIL NFR BLD AUTO: 0 % — SIGNIFICANT CHANGE UP (ref 0–6)
GGT SERPL-CCNC: 116 U/L — HIGH (ref 9–50)
GLUCOSE SERPL-MCNC: 137 MG/DL — HIGH (ref 70–99)
HCT VFR BLD CALC: 29.3 % — LOW (ref 39–50)
HCV AB S/CO SERPL IA: 0.04 S/CO — SIGNIFICANT CHANGE UP
HCV AB SERPL-IMP: SIGNIFICANT CHANGE UP
HGB BLD-MCNC: 9.2 G/DL — LOW (ref 13–17)
IMM GRANULOCYTES NFR BLD AUTO: 0.4 % — SIGNIFICANT CHANGE UP (ref 0–1.5)
LYMPHOCYTES # BLD AUTO: 1.11 K/UL — SIGNIFICANT CHANGE UP (ref 1–3.3)
LYMPHOCYTES # BLD AUTO: 11.7 % — LOW (ref 13–44)
MAGNESIUM SERPL-MCNC: 2.9 MG/DL — HIGH (ref 1.6–2.6)
MCHC RBC-ENTMCNC: 25.2 PG — LOW (ref 27–34)
MCHC RBC-ENTMCNC: 31.4 GM/DL — LOW (ref 32–36)
MCV RBC AUTO: 80.3 FL — SIGNIFICANT CHANGE UP (ref 80–100)
MONOCYTES # BLD AUTO: 0.73 K/UL — SIGNIFICANT CHANGE UP (ref 0–0.9)
MONOCYTES NFR BLD AUTO: 7.7 % — SIGNIFICANT CHANGE UP (ref 2–14)
NEUTROPHILS # BLD AUTO: 7.61 K/UL — HIGH (ref 1.8–7.4)
NEUTROPHILS NFR BLD AUTO: 80.1 % — HIGH (ref 43–77)
NRBC # BLD: 0 /100 WBCS — SIGNIFICANT CHANGE UP (ref 0–0)
PHOSPHATE SERPL-MCNC: 2.6 MG/DL — SIGNIFICANT CHANGE UP (ref 2.5–4.5)
PLATELET # BLD AUTO: 294 K/UL — SIGNIFICANT CHANGE UP (ref 150–400)
POTASSIUM SERPL-MCNC: 5 MMOL/L — SIGNIFICANT CHANGE UP (ref 3.5–5.3)
POTASSIUM SERPL-SCNC: 5 MMOL/L — SIGNIFICANT CHANGE UP (ref 3.5–5.3)
PROT SERPL-MCNC: 8.1 G/DL — SIGNIFICANT CHANGE UP (ref 6–8.3)
RBC # BLD: 3.65 M/UL — LOW (ref 4.2–5.8)
RBC # FLD: 13.8 % — SIGNIFICANT CHANGE UP (ref 10.3–14.5)
SARS-COV-2 IGG+IGM SERPL QL IA: 33.7 U/ML — HIGH
SARS-COV-2 IGG+IGM SERPL QL IA: POSITIVE
SODIUM SERPL-SCNC: 135 MMOL/L — SIGNIFICANT CHANGE UP (ref 135–145)
WBC # BLD: 9.5 K/UL — SIGNIFICANT CHANGE UP (ref 3.8–10.5)
WBC # FLD AUTO: 9.5 K/UL — SIGNIFICANT CHANGE UP (ref 3.8–10.5)

## 2021-08-13 PROCEDURE — 99233 SBSQ HOSP IP/OBS HIGH 50: CPT | Mod: GC

## 2021-08-13 RX ORDER — PANTOPRAZOLE SODIUM 20 MG/1
40 TABLET, DELAYED RELEASE ORAL
Refills: 0 | Status: DISCONTINUED | OUTPATIENT
Start: 2021-08-13 | End: 2021-08-17

## 2021-08-13 RX ADMIN — HEPARIN SODIUM 5000 UNIT(S): 5000 INJECTION INTRAVENOUS; SUBCUTANEOUS at 21:45

## 2021-08-13 RX ADMIN — HEPARIN SODIUM 5000 UNIT(S): 5000 INJECTION INTRAVENOUS; SUBCUTANEOUS at 13:03

## 2021-08-13 RX ADMIN — HYDROMORPHONE HYDROCHLORIDE 2 MILLIGRAM(S): 2 INJECTION INTRAMUSCULAR; INTRAVENOUS; SUBCUTANEOUS at 13:03

## 2021-08-13 RX ADMIN — Medication 40 MILLIGRAM(S): at 05:10

## 2021-08-13 RX ADMIN — Medication 975 MILLIGRAM(S): at 17:44

## 2021-08-13 RX ADMIN — PANTOPRAZOLE SODIUM 40 MILLIGRAM(S): 20 TABLET, DELAYED RELEASE ORAL at 06:57

## 2021-08-13 RX ADMIN — HYDROMORPHONE HYDROCHLORIDE 2 MILLIGRAM(S): 2 INJECTION INTRAMUSCULAR; INTRAVENOUS; SUBCUTANEOUS at 21:45

## 2021-08-13 RX ADMIN — HYDROMORPHONE HYDROCHLORIDE 2 MILLIGRAM(S): 2 INJECTION INTRAMUSCULAR; INTRAVENOUS; SUBCUTANEOUS at 13:43

## 2021-08-13 RX ADMIN — Medication 975 MILLIGRAM(S): at 17:04

## 2021-08-13 RX ADMIN — HEPARIN SODIUM 5000 UNIT(S): 5000 INJECTION INTRAVENOUS; SUBCUTANEOUS at 05:10

## 2021-08-13 RX ADMIN — Medication 200 MILLIGRAM(S): at 05:10

## 2021-08-13 RX ADMIN — ATORVASTATIN CALCIUM 80 MILLIGRAM(S): 80 TABLET, FILM COATED ORAL at 21:46

## 2021-08-13 RX ADMIN — AMLODIPINE BESYLATE 5 MILLIGRAM(S): 2.5 TABLET ORAL at 05:11

## 2021-08-13 NOTE — PROGRESS NOTE ADULT - PROBLEM SELECTOR PLAN 2
hx of CKD w/ creatinine 2.09 (baseline 1.96)  -trend creatinine, avoid nephrotic agents (NSAIDS or colchicine) Severe b/l OA 2/2 repeated trauma from exercise. Pain has gotten worse the last couple of weeks, pt cannot ambulate.  -PT recommended [facility]  -Pain regimen: Tylenol 975 q8h prn for mild pain, Dilaudid PO 2mg q8h prn for severe pain

## 2021-08-13 NOTE — PROGRESS NOTE ADULT - ASSESSMENT
56 yo M PMH HTN, CKD, anemia, arthritis p/w b/l knee pain. Pt says his pain has gotton worse the last couple of weeks. Usually uses walker to ambulate however cannot even get out of bed anymore. Says he tried to see his orthopedic Dr. Nunez about b/l knee replacements however he needs clearance from nephrology and cardiology. Denies fever, chills, chest pain, sob. Usually has gout flare only in his big toe. Compliant with his allopurinol, does not know his other homes meds.     ED Vitals: afebrile, HR 79, 155/93, RR 16, SPO2 100 room air  ED Labs: , No leukocytosis, Hgb 10.6, creatinine 2.09 (baseline 1.96),   ED Imagin. Knee X rays: supra-patellar effusions w/ OA    ED Course: Ortho consulted for concern for septic joints - tap: bloody w/ crystals, no infection   56 yo M PMH HTN, CKD, anemia, arthritis p/w b/l knee pain on 8/11 found to have uric acid crystals in L knee. Prednisone 40mg qd started 8/12 for gout flare and pain control for OA flare.

## 2021-08-13 NOTE — PROGRESS NOTE ADULT - PROBLEM SELECTOR PLAN 3
F: None   E: Replete for K<4 Mag<2  N: Regular Diet  A: lovenox  Code status: Full  Disposition: [facility] Outpatient nephrologist c/f NSAID related injury. Was CKD 4 outpatient, now improving.  -trend creatinine  -avoid nephrotic agents (NSAIDS or colchicine)

## 2021-08-13 NOTE — PROGRESS NOTE ADULT - PROBLEM SELECTOR PLAN 1
Severe b/l OA 2/2 repeated trauma from exercise. Pain has gotton worse the last couple of weeks, pt cannot ambulate. Concern for septic joint in ED, tap by ortho showing blood w/ crystals, no infection. Admitted as unable to ambulate  -PT recs  -ortho recs regarding b/l knee replacements  -pain control - Voltaren gel for outpatient use    #Gout in Left Knee: 10k WBC + uric acid, physical exam with erythema, would treat for Gout flare of knee as precipitating cause of worsening pain.  -Start Prednisone 40mg Qd   -med rec dose - ordered allpurinol 50mg qd - based on gfr 40 L knee with uric acid crystals. Cannot use colchicine or NSAIDs given CKD.  -prednisone 40mg qd with protonix 40mg qd for 7-10 course based on clinical response.  -c/w home allopurinol 200mg daily

## 2021-08-13 NOTE — PROGRESS NOTE ADULT - SUBJECTIVE AND OBJECTIVE BOX
Internal Medicine Progress Note  Tracie Jayne, PGY-1  Pager: [phone]    ******INCOMPLETE******    Patient is a 55y old  Male who presents with a chief complaint of weakness (12 Aug 2021 09:30)    OVERNIGHT EVENTS/INTERVAL HPI:    OBJECTIVE:  Vital Signs Last 24 Hrs  T(C): 36.9 (13 Aug 2021 04:39), Max: 37.2 (12 Aug 2021 14:14)  T(F): 98.4 (13 Aug 2021 04:39), Max: 98.9 (12 Aug 2021 14:14)  HR: 77 (13 Aug 2021 04:39) (71 - 81)  BP: 166/91 (13 Aug 2021 04:39) (135/73 - 166/91)  BP(mean): 116 (13 Aug 2021 04:39) (116 - 116)  RR: 17 (13 Aug 2021 04:39) (16 - 17)  SpO2: 100% (13 Aug 2021 04:39) (98% - 100%)  I&O's Detail    12 Aug 2021 07:01  -  13 Aug 2021 06:21  --------------------------------------------------------  IN:    Oral Fluid: 600 mL  Total IN: 600 mL    OUT:    Voided (mL): 850 mL  Total OUT: 850 mL    Total NET: -250 mL        Physical Exam:  GENERAL: Awake, alert and interactive, no acute distress, appears comfortable  NEURO: A&Ox4, no focal deficits, 5/5 strength in all ext, reflexes 2+ throughout  HEENT: Normocephalic, atraumatic, CN2-12 intact, no conjunctivitis or scleral icterus, oral mucosa moist, no oral lesions noted  NECK: Supple, no JVD, no LAD, thyroid not palpable  CARDIAC: Regular rate and rhythm, +S1/S2, no murmurs/rubs/gallops  PULM: Breathing comfortably on RA, clear to auscultation bilaterally, no wheezes/rales/rhonchi, no inspiratory stridor  ABDOMEN: Soft, nontender, nondistended, +bs, no hepatosplenomegaly, no rebound tenderness or fluid wave, no CVA tenderness  : Deferred  MSK: Range of motion grossly intact, no back tenderness  SKIN: Warm and dry, no rashes, no lesions  VASC: 2+ peripheral pulses, no edema  Psych: Appropriate affect    Medications:  MEDICATIONS  (STANDING):  allopurinol 200 milliGRAM(s) Oral daily  amLODIPine   Tablet 5 milliGRAM(s) Oral daily  atorvastatin 80 milliGRAM(s) Oral at bedtime  heparin   Injectable 5000 Unit(s) SubCutaneous every 8 hours  predniSONE   Tablet 40 milliGRAM(s) Oral daily  senna 2 Tablet(s) Oral at bedtime    MEDICATIONS  (PRN):  acetaminophen   Tablet .. 975 milliGRAM(s) Oral every 8 hours PRN Mild Pain (1 - 3)  HYDROmorphone   Tablet 2 milliGRAM(s) Oral every 8 hours PRN Severe Pain (7 - 10)  polyethylene glycol 3350 17 Gram(s) Oral daily PRN Constipation      Labs:                        8.9    8.49  )-----------( 268      ( 12 Aug 2021 10:26 )             28.2     08-12    132<L>  |  100  |  42<H>  ----------------------------<  177<H>  4.4   |  22  |  2.34<H>    Ca    9.6      12 Aug 2021 10:26  Phos  4.4     08-12  Mg     2.2     08-12    TPro  9.1<H>  /  Alb  4.0  /  TBili  0.6  /  DBili  x   /  AST  35  /  ALT  35  /  AlkPhos  133<H>  08-11        COVID-19 PCR: NotDetec (11 Aug 2021 21:02)      Radiology: Reviewed Internal Medicine Progress Note  Tracie Jayne, PGY-1  Pager: [phone]    Hospital Course:  56 yo M PMH HTN, CKD, anemia, arthritis p/w b/l knee pain on 8/11 found to have uric acid crystals in L knee. Prednisone 40mg qd started 8/12 for gout flare and pain control for OA flare.    OVERNIGHT EVENTS/INTERVAL HPI: FAUSTINO overnight. Patient notes improvement in knee pain and increased mobility. C/o mild gassiness. Not c/o CP, SOB, abd pain, n/v/d/c, or dysuria. Patient is requesting COVID-19 vaccine.    OBJECTIVE:  Vital Signs Last 24 Hrs  T(C): 36.9 (13 Aug 2021 04:39), Max: 37.2 (12 Aug 2021 14:14)  T(F): 98.4 (13 Aug 2021 04:39), Max: 98.9 (12 Aug 2021 14:14)  HR: 77 (13 Aug 2021 04:39) (71 - 81)  BP: 166/91 (13 Aug 2021 04:39) (135/73 - 166/91)  BP(mean): 116 (13 Aug 2021 04:39) (116 - 116)  RR: 17 (13 Aug 2021 04:39) (16 - 17)  SpO2: 100% (13 Aug 2021 04:39) (98% - 100%)  I&O's Detail    12 Aug 2021 07:01  -  13 Aug 2021 06:21  --------------------------------------------------------  IN:    Oral Fluid: 600 mL  Total IN: 600 mL    OUT:    Voided (mL): 850 mL  Total OUT: 850 mL    Total NET: -250 mL      Physical Exam:  GENERAL: Awake, alert and interactive, no acute distress, appears comfortable  NEURO: A&Ox4, no focal deficits  HEENT: Normocephalic, atraumatic, CN2-12 intact, no conjunctivitis or scleral icterus, oral mucosa moist  NECK: Supple  CARDIAC: Regular rate and rhythm, +S1/S2, no murmurs/rubs/gallops  PULM: Breathing comfortably on RA, clear to auscultation bilaterally, no wheezes/rales/rhonchi  ABDOMEN: Soft, nontender, nondistended, +bs  : Deferred  MSK: B/l knees with dressing from tap in place and c/d/i. Mild knee swelling. Neither knee warm this morning.  SKIN: Warm and dry  VASC: 2+ peripheral pulses, no edema  Psych: Appropriate affect    Medications:  MEDICATIONS  (STANDING):  allopurinol 200 milliGRAM(s) Oral daily  amLODIPine   Tablet 5 milliGRAM(s) Oral daily  atorvastatin 80 milliGRAM(s) Oral at bedtime  heparin   Injectable 5000 Unit(s) SubCutaneous every 8 hours  predniSONE   Tablet 40 milliGRAM(s) Oral daily  senna 2 Tablet(s) Oral at bedtime    MEDICATIONS  (PRN):  acetaminophen   Tablet .. 975 milliGRAM(s) Oral every 8 hours PRN Mild Pain (1 - 3)  HYDROmorphone   Tablet 2 milliGRAM(s) Oral every 8 hours PRN Severe Pain (7 - 10)  polyethylene glycol 3350 17 Gram(s) Oral daily PRN Constipation      Labs:                        8.9    8.49  )-----------( 268      ( 12 Aug 2021 10:26 )             28.2     08-12    132<L>  |  100  |  42<H>  ----------------------------<  177<H>  4.4   |  22  |  2.34<H>    Ca    9.6      12 Aug 2021 10:26  Phos  4.4     08-12  Mg     2.2     08-12    TPro  9.1<H>  /  Alb  4.0  /  TBili  0.6  /  DBili  x   /  AST  35  /  ALT  35  /  AlkPhos  133<H>  08-11        COVID-19 PCR: NotDetec (11 Aug 2021 21:02)      Radiology: Reviewed

## 2021-08-13 NOTE — PROGRESS NOTE ADULT - SUBJECTIVE AND OBJECTIVE BOX
Ortho Note    Pt comfortable without complaints, pain controlled  Denies CP, SOB, N/V, numbness/tingling     Vital Signs Last 24 Hrs  T(C): 36.9 (08-13-21 @ 04:39), Max: 36.9 (08-13-21 @ 04:39)  T(F): 98.4 (08-13-21 @ 04:39), Max: 98.4 (08-13-21 @ 04:39)  HR: 77 (08-13-21 @ 04:39) (77 - 77)  BP: 166/91 (08-13-21 @ 04:39) (166/91 - 166/91)  BP(mean): 116 (08-13-21 @ 04:39) (116 - 116)  RR: 17 (08-13-21 @ 04:39) (17 - 17)  SpO2: 100% (08-13-21 @ 04:39) (100% - 100%)  I&O's Summary    12 Aug 2021 07:01  -  13 Aug 2021 06:45  --------------------------------------------------------  IN: 600 mL / OUT: 850 mL / NET: -250 mL        Physical Exam:  General: Pt Alert and oriented, NAD  R knee ranging 0-90, L knee 0-100  Pulses: 2+ pedal pulses warm and well perfused  Sensation: SILT in b/l LE  Motor: 5/5 EHL/FHL/TA/GS                          8.9    8.49  )-----------( 268      ( 12 Aug 2021 10:26 )             28.2     08-12    132<L>  |  100  |  42<H>  ----------------------------<  177<H>  4.4   |  22  |  2.34<H>    Ca    9.6      12 Aug 2021 10:26  Phos  4.4     08-12  Mg     2.2     08-12    TPro  9.1<H>  /  Alb  4.0  /  TBili  0.6  /  DBili  x   /  AST  35  /  ALT  35  /  AlkPhos  133<H>  08-11      A/P: 55yMale with HTN, CKD, Gout who presents with acute OA flare of bilateral knees  - Stable  - Pain Control  - no acute ortho intervention at this time, patient plans to undergo bilateral TKA once medically cleared  - PT, WBS: WBAT   - Dispo: [facility]    Ortho Pager [phone]

## 2021-08-14 ENCOUNTER — TRANSCRIPTION ENCOUNTER (OUTPATIENT)
Age: 56
End: 2021-08-14

## 2021-08-14 DIAGNOSIS — I10 ESSENTIAL (PRIMARY) HYPERTENSION: ICD-10-CM

## 2021-08-14 LAB
ANION GAP SERPL CALC-SCNC: 8 MMOL/L — SIGNIFICANT CHANGE UP (ref 5–17)
BASOPHILS # BLD AUTO: 0.02 K/UL — SIGNIFICANT CHANGE UP (ref 0–0.2)
BASOPHILS NFR BLD AUTO: 0.2 % — SIGNIFICANT CHANGE UP (ref 0–2)
BUN SERPL-MCNC: 44 MG/DL — HIGH (ref 7–23)
CALCIUM SERPL-MCNC: 9.6 MG/DL — SIGNIFICANT CHANGE UP (ref 8.4–10.5)
CHLORIDE SERPL-SCNC: 101 MMOL/L — SIGNIFICANT CHANGE UP (ref 96–108)
CO2 SERPL-SCNC: 26 MMOL/L — SIGNIFICANT CHANGE UP (ref 22–31)
CREAT SERPL-MCNC: 1.94 MG/DL — HIGH (ref 0.5–1.3)
EOSINOPHIL # BLD AUTO: 0.01 K/UL — SIGNIFICANT CHANGE UP (ref 0–0.5)
EOSINOPHIL NFR BLD AUTO: 0.1 % — SIGNIFICANT CHANGE UP (ref 0–6)
GLUCOSE SERPL-MCNC: 118 MG/DL — HIGH (ref 70–99)
HCT VFR BLD CALC: 29.4 % — LOW (ref 39–50)
HGB BLD-MCNC: 9 G/DL — LOW (ref 13–17)
IMM GRANULOCYTES NFR BLD AUTO: 0.4 % — SIGNIFICANT CHANGE UP (ref 0–1.5)
LYMPHOCYTES # BLD AUTO: 2.48 K/UL — SIGNIFICANT CHANGE UP (ref 1–3.3)
LYMPHOCYTES # BLD AUTO: 21.9 % — SIGNIFICANT CHANGE UP (ref 13–44)
MAGNESIUM SERPL-MCNC: 2.7 MG/DL — HIGH (ref 1.6–2.6)
MCHC RBC-ENTMCNC: 25.1 PG — LOW (ref 27–34)
MCHC RBC-ENTMCNC: 30.6 GM/DL — LOW (ref 32–36)
MCV RBC AUTO: 81.9 FL — SIGNIFICANT CHANGE UP (ref 80–100)
MONOCYTES # BLD AUTO: 1.03 K/UL — HIGH (ref 0–0.9)
MONOCYTES NFR BLD AUTO: 9.1 % — SIGNIFICANT CHANGE UP (ref 2–14)
NEUTROPHILS # BLD AUTO: 7.74 K/UL — HIGH (ref 1.8–7.4)
NEUTROPHILS NFR BLD AUTO: 68.3 % — SIGNIFICANT CHANGE UP (ref 43–77)
NRBC # BLD: 0 /100 WBCS — SIGNIFICANT CHANGE UP (ref 0–0)
PHOSPHATE SERPL-MCNC: 2.4 MG/DL — LOW (ref 2.5–4.5)
PLATELET # BLD AUTO: 300 K/UL — SIGNIFICANT CHANGE UP (ref 150–400)
POTASSIUM SERPL-MCNC: 4.8 MMOL/L — SIGNIFICANT CHANGE UP (ref 3.5–5.3)
POTASSIUM SERPL-SCNC: 4.8 MMOL/L — SIGNIFICANT CHANGE UP (ref 3.5–5.3)
RBC # BLD: 3.59 M/UL — LOW (ref 4.2–5.8)
RBC # FLD: 13.9 % — SIGNIFICANT CHANGE UP (ref 10.3–14.5)
SODIUM SERPL-SCNC: 135 MMOL/L — SIGNIFICANT CHANGE UP (ref 135–145)
WBC # BLD: 11.32 K/UL — HIGH (ref 3.8–10.5)
WBC # FLD AUTO: 11.32 K/UL — HIGH (ref 3.8–10.5)

## 2021-08-14 PROCEDURE — 99233 SBSQ HOSP IP/OBS HIGH 50: CPT | Mod: GC

## 2021-08-14 RX ORDER — BNT162B2 0.23 MG/2.25ML
0.3 INJECTION, SUSPENSION INTRAMUSCULAR ONCE
Refills: 0 | Status: COMPLETED | OUTPATIENT
Start: 2021-08-14 | End: 2021-08-14

## 2021-08-14 RX ORDER — NABUMETONE 750 MG
1 TABLET ORAL
Qty: 0 | Refills: 0 | DISCHARGE

## 2021-08-14 RX ORDER — PANTOPRAZOLE SODIUM 20 MG/1
1 TABLET, DELAYED RELEASE ORAL
Qty: 0 | Refills: 0 | DISCHARGE
Start: 2021-08-14 | End: 2021-08-19

## 2021-08-14 RX ADMIN — AMLODIPINE BESYLATE 5 MILLIGRAM(S): 2.5 TABLET ORAL at 05:50

## 2021-08-14 RX ADMIN — Medication 200 MILLIGRAM(S): at 05:50

## 2021-08-14 RX ADMIN — HEPARIN SODIUM 5000 UNIT(S): 5000 INJECTION INTRAVENOUS; SUBCUTANEOUS at 16:00

## 2021-08-14 RX ADMIN — HEPARIN SODIUM 5000 UNIT(S): 5000 INJECTION INTRAVENOUS; SUBCUTANEOUS at 05:51

## 2021-08-14 RX ADMIN — Medication 30 MILLIGRAM(S): at 05:51

## 2021-08-14 RX ADMIN — ATORVASTATIN CALCIUM 80 MILLIGRAM(S): 80 TABLET, FILM COATED ORAL at 22:24

## 2021-08-14 RX ADMIN — BNT162B2 0.3 MILLILITER(S): 0.23 INJECTION, SUSPENSION INTRAMUSCULAR at 18:02

## 2021-08-14 RX ADMIN — HEPARIN SODIUM 5000 UNIT(S): 5000 INJECTION INTRAVENOUS; SUBCUTANEOUS at 22:23

## 2021-08-14 RX ADMIN — SENNA PLUS 2 TABLET(S): 8.6 TABLET ORAL at 22:24

## 2021-08-14 RX ADMIN — PANTOPRAZOLE SODIUM 40 MILLIGRAM(S): 20 TABLET, DELAYED RELEASE ORAL at 05:51

## 2021-08-14 NOTE — PROGRESS NOTE ADULT - SUBJECTIVE AND OBJECTIVE BOX
Patient is a 55y old  Male who presents with a chief complaint of Gout/OA flare (14 Aug 2021 12:34)      INTERVAL HPI/OVERNIGHT EVENTS:    Pt. seen and examined earlier today  Pt. feels well, reports improved knee pain and ROM  No F/C, CP, SOB    Review of Systems: 12 point review of systems otherwise negative    MEDICATIONS  (STANDING):  allopurinol 200 milliGRAM(s) Oral daily  amLODIPine   Tablet 5 milliGRAM(s) Oral daily  atorvastatin 80 milliGRAM(s) Oral at bedtime  coronavirus (EUA) Vaccine (PFIZER) 0.3 milliLiter(s) IntraMuscular once  heparin   Injectable 5000 Unit(s) SubCutaneous every 8 hours  pantoprazole    Tablet 40 milliGRAM(s) Oral before breakfast  predniSONE   Tablet 30 milliGRAM(s) Oral daily  senna 2 Tablet(s) Oral at bedtime    MEDICATIONS  (PRN):  acetaminophen   Tablet .. 975 milliGRAM(s) Oral every 8 hours PRN Mild Pain (1 - 3)  HYDROmorphone   Tablet 2 milliGRAM(s) Oral every 8 hours PRN Severe Pain (7 - 10)  polyethylene glycol 3350 17 Gram(s) Oral daily PRN Constipation      Allergies    No Known Allergies    Intolerances          Vital Signs Last 24 Hrs  T(C): 36.7 (14 Aug 2021 16:35), Max: 36.9 (14 Aug 2021 15:02)  T(F): 98.1 (14 Aug 2021 16:35), Max: 98.5 (14 Aug 2021 15:02)  HR: 68 (14 Aug 2021 16:35) (61 - 84)  BP: 135/77 (14 Aug 2021 16:35) (135/77 - 168/90)  BP(mean): 116 (14 Aug 2021 04:50) (116 - 116)  RR: 17 (14 Aug 2021 16:35) (16 - 19)  SpO2: 98% (14 Aug 2021 16:35) (97% - 99%)  CAPILLARY BLOOD GLUCOSE          08-13 @ 07:01  -  08-14 @ 07:00  --------------------------------------------------------  IN: 1080 mL / OUT: 1650 mL / NET: -570 mL    08-14 @ 07:01  -  08-14 @ 17:00  --------------------------------------------------------  IN: 810 mL / OUT: 0 mL / NET: 810 mL        Physical Exam:  (earlier today)  Daily     Daily   General:  comfortable-appearing in NAD  HEENT:  MMM  Extremities: B/L knee swelling  Skin:  WWP  Neuro:  AAOx3    LABS:                        9.0    11.32 )-----------( 300      ( 14 Aug 2021 07:31 )             29.4     08-14    135  |  101  |  44<H>  ----------------------------<  118<H>  4.8   |  26  |  1.94<H>    Ca    9.6      14 Aug 2021 07:31  Phos  2.4     08-14  Mg     2.7     08-14    TPro  8.1  /  Alb  3.3  /  TBili  0.3  /  DBili  x   /  AST  38  /  ALT  41  /  AlkPhos  144<H>  08-13

## 2021-08-14 NOTE — PROGRESS NOTE ADULT - PROBLEM SELECTOR PLAN 2
Severe b/l OA 2/2 repeated trauma from exercise. Pain has gotten worse the last couple of weeks, pt cannot ambulate.  -PT recommended [facility]  -Pain regimen: Tylenol 975 q8h prn for mild pain, Dilaudid PO 2mg q8h prn for severe pain

## 2021-08-14 NOTE — PROGRESS NOTE ADULT - PROBLEM SELECTOR PLAN 3
Outpatient nephrologist c/f NSAID related injury. Was CKD 4 outpatient, now improving.  -trend creatinine  -avoid nephrotic agents (NSAIDS or colchicine)

## 2021-08-14 NOTE — PROGRESS NOTE ADULT - PROBLEM SELECTOR PLAN 1
L knee with uric acid crystals. Cannot use colchicine or NSAIDs given CKD.  -prednisone taper starting this morning. 30mg today and tomorrow, then 20mg for 2d, then 10mg for 2d.   -protonix 40mg qd until off steroids (last day 8/19)  -c/w home allopurinol 200mg daily

## 2021-08-14 NOTE — DISCHARGE NOTE PROVIDER - HOSPITAL COURSE
54 yo M PMH HTN, CKD, anemia, arthritis p/w b/l knee pain on 8/11 treated for L knee gout flare and b/l knee OA flare.    Hospital course (by problem):   #Gout flare: L knee with uric acid crystals. Cannot use colchicine or NSAIDs given CKD.  -Prednisone 40mg started 8/12. Decreased to 30mg 8/14 and 8/15, then 20mg for 2d, then 10mg for 2d.   -protonix 40mg qd until off steroids (last day 8/19)  -c/w home allopurinol 200mg daily.     #Osteoarthritis of both knees: Severe b/l OA 2/2 repeated trauma from exercise. Pain has gotten worse the last couple of weeks, pt cannot ambulate.  -PT recommended [facility] ([place]  -Pain regimen inpatient: Tylenol 975 q8h prn for mild pain, Dilaudid PO 2mg q8h prn for severe pain.     #Stage 3 chronic kidney disease.  Plan: Outpatient nephrologist c/f NSAID related injury. Was CKD 4 outpatient, now improving.  -f/u outpatient nephrologist  -patient re-counseled on avoiding NSAIDs.     #Hypertension:   -c/w home amlodipine 5mg.     #Elevated AlkPhos and GGT  -f/u PCP/GI outpatient.     #Cardiovascular disease: cards o/p note with c/f SARTHAK in V1 and V2  -f/u cards outpatient.      Patient was discharged to: [facility] ([place]    New medications: prednisone taper: _____  Changes to old medications: None  Medications that were stopped: None    Items to follow up as outpatient: f/u nepro, ortho, GI, cards    Physical exam at the time of discharge:       54 yo M PMH HTN, CKD, anemia, arthritis p/w b/l knee pain on 8/11 treated for L knee gout flare and b/l knee OA flare.    Hospital course (by problem):   #Gout flare: L knee with uric acid crystals. Cannot use colchicine or NSAIDs given CKD.  -Prednisone 40mg started 8/12. Decreased to 30mg 8/14 and 8/15, then 20mg for 2d, then 10mg for 2d.   -protonix 40mg qd until off steroids (last day 8/19)  -c/w home allopurinol 200mg daily.     #Osteoarthritis of both knees: Severe b/l OA 2/2 repeated trauma from exercise. Pain has gotten worse the last couple of weeks, pt cannot ambulate.  -PT recommended [facility]  -Pain regimen inpatient: Tylenol 975 q8h prn for mild pain, Dilaudid PO 2mg q8h prn for severe pain.     #Stage 3 chronic kidney disease.  Plan: Outpatient nephrologist c/f NSAID related injury. Was CKD 4 outpatient, now improving.  -f/u outpatient nephrologist  -patient re-counseled on avoiding NSAIDs.     #Hypertension:   -c/w home amlodipine 5mg.     #Elevated AlkPhos and GGT  -f/u PCP/GI outpatient.     #Cardiovascular disease: cards o/p note with c/f SARTHAK in V1 and V2  -f/u cards outpatient.      Patient was discharged to: [facility]    New medications: prednisone taper: _____  Changes to old medications: None  Medications that were stopped: None  Items to follow up as outpatient: f/u nepro, ortho, GI, cards. Patient received the first dose of the Pfizer COVID-19 vaccine on 8/14 and is due for second dose 9/4    Physical exam at the time of discharge:       54 yo M PMH HTN, CKD, anemia, arthritis p/w b/l knee pain on 8/11 treated for L knee gout flare and b/l knee OA flare.    Hospital course (by problem):   #Gout flare: L knee with uric acid crystals. Cannot use colchicine or NSAIDs given CKD.  -Prednisone 40mg started 8/12. Decreased to 30mg 8/14 and 8/15, then 20mg for 2d, then 10mg for 2d.   -protonix 40mg qd until off steroids (last day 8/19)  -c/w home allopurinol 200mg daily.     #Osteoarthritis of both knees: Severe b/l OA 2/2 repeated trauma from exercise. Pain has gotten worse the last couple of weeks, pt cannot ambulate.  -PT recommended [facility]  -Pain regimen inpatient: Tylenol 975 q8h prn for mild pain, Dilaudid PO 2mg q8h prn for severe pain.     #Stage 3 chronic kidney disease.  Plan: Outpatient nephrologist c/f NSAID related injury. Was CKD 4 outpatient, now improving.  -f/u outpatient nephrologist  -patient re-counseled on avoiding NSAIDs.     #Hypertension:   -c/w home amlodipine 5mg.     #Elevated AlkPhos and GGT  -f/u PCP/GI outpatient.     #Cardiovascular disease: cards o/p note with c/f SARTHAK in V1 and V2  -f/u cards outpatient.      Patient was discharged to: [facility]    New medications: prednisone taper: _____  Changes to old medications: None  Medications that were stopped: None  Items to follow up as outpatient: f/u nepro, ortho, GI, cards. Patient received the first dose of the Pfizer COVID-19 vaccine on 8/14 and is due for second dose 9/4    Physical exam at the time of discharge:    VITAL SIGNS  T(C): 36.7 (15 Aug 2021 09:35), Max: 36.9 (14 Aug 2021 15:02)  T(F): 98.1 (15 Aug 2021 09:35), Max: 98.5 (14 Aug 2021 15:02)  HR: 69 (15 Aug 2021 09:35) (61 - 73)  BP: 151/82 (15 Aug 2021 09:35) (135/77 - 161/89)  RR: 16 (15 Aug 2021 09:35) (16 - 19)  SpO2: 100% (15 Aug 2021 09:35) (97% - 100%)    GENERAL: Awake, alert and interactive, no acute distress, appears comfortable  NEURO: A&Ox4, no focal deficits  HEENT: Normocephalic, atraumatic, EOMI, oral mucosa moist  NECK: Supple  CARDIAC: Regular rate and rhythm, +S1/S2, no murmurs/rubs/gallops  PULM: Breathing comfortably on RA, clear to auscultation bilaterally, no wheezes/rales/rhonchi  ABDOMEN: Soft, nontender, nondistended, no masses or   : Deferred  MSK: B/l knees with mild swelling. Neither knee warm this morning.  SKIN: Warm and dry  VASC: 2+ peripheral pulses, no edema  Psych: Appropriate affect     56 yo M PMH HTN, CKD, anemia, arthritis p/w b/l knee pain on 8/11 treated for L knee gout flare and b/l knee OA flare.    Hospital course (by problem):   #Gout flare: L knee with uric acid crystals. Cannot use colchicine or NSAIDs given CKD.  -Prednisone 40mg started 8/12. Decreased to 30mg 8/14 and 8/15, then 20mg for 2d, then 10mg for 2d.   -protonix 40mg qd until off steroids (last day 8/19)  -c/w home allopurinol 200mg daily.     #Osteoarthritis of both knees: Severe b/l OA 2/2 repeated trauma from exercise. Pain has gotten worse the last couple of weeks, pt cannot ambulate.  -PT recommended [facility]  -Pain regimen inpatient: Tylenol 975 q8h prn for mild pain, Dilaudid PO 2mg q8h prn for severe pain.     #Stage 3 chronic kidney disease.  Plan: Outpatient nephrologist c/f NSAID related injury. Was CKD 4 outpatient, now improving.  -f/u outpatient nephrologist  -patient re-counseled on avoiding NSAIDs.     #Hypertension:   -c/w home amlodipine 5mg.     #Cardiovascular disease: cards o/p note with c/f SARTHAK in V1 and V2  -f/u cards outpatient.      Patient was discharged to: [facility]    New medications: prednisone taper: 10mg in the morning 8/18 and 8/19; protonix 40mg qd until 8/19. Tylenol 975 q8h prn for mild pain, Dilaudid PO 2mg q8h prn for severe pain.   Changes to old medications: Allopurinol decreased from 200mg to 50mg  Medications that were stopped: None  Items to follow up as outpatient: f/u nepro, ortho, cards. Patient received the first dose of the Pfizer COVID-19 vaccine on 8/14 and is due for second dose 9/4    Physical exam at the time of discharge:    Vital Signs Last 24 Hrs  T(C): 36.8 (17 Aug 2021 08:23), Max: 37.3 (16 Aug 2021 20:08)  T(F): 98.3 (17 Aug 2021 08:23), Max: 99.1 (16 Aug 2021 20:08)  HR: 68 (17 Aug 2021 08:23) (68 - 77)  BP: 161/94 (17 Aug 2021 08:23) (136/84 - 161/94)  BP(mean): 108 (17 Aug 2021 05:00) (108 - 108)  RR: 18 (17 Aug 2021 08:23) (17 - 18)  SpO2: 96% (17 Aug 2021 08:23) (96% - 100%)    GENERAL: Awake, alert and interactive, no acute distress, appears comfortable  NEURO: A&Ox4, no focal deficits  HEENT: Normocephalic, atraumatic, EOMI, oral mucosa moist  NECK: Supple  CARDIAC: Regular rate and rhythm, +S1/S2, no murmurs/rubs/gallops  PULM: Breathing comfortably on RA, clear to auscultation bilaterally, no wheezes/rales/rhonchi  ABDOMEN: Soft, nontender, nondistended, no masses or   : Deferred  MSK: B/l knees with mild swelling, no erythema/warmth. No fluid noted. Myoclonus with movement of R knee. B/l knee ROM limited.  SKIN: Warm and dry  VASC: 2+ peripheral pulses, no edema  Psych: Appropriate affect

## 2021-08-14 NOTE — PROGRESS NOTE ADULT - SUBJECTIVE AND OBJECTIVE BOX
Internal Medicine Progress Note  Tracie Jayne, PGY-1  Pager: [phone]    ******INCOMPLETE******    Patient is a 55y old  Male who presents with a chief complaint of weakness (13 Aug 2021 06:43)    OVERNIGHT EVENTS/INTERVAL HPI:    OBJECTIVE:  Vital Signs Last 24 Hrs  T(C): 36.8 (14 Aug 2021 04:50), Max: 36.9 (13 Aug 2021 16:51)  T(F): 98.3 (14 Aug 2021 04:50), Max: 98.4 (13 Aug 2021 16:51)  HR: 62 (13 Aug 2021 20:48) (62 - 74)  BP: 168/90 (14 Aug 2021 04:50) (145/78 - 168/90)  BP(mean): 116 (14 Aug 2021 04:50) (116 - 116)  RR: 16 (14 Aug 2021 04:50) (16 - 17)  SpO2: 99% (14 Aug 2021 04:50) (97% - 99%)  I&O's Detail    13 Aug 2021 07:01  -  14 Aug 2021 07:00  --------------------------------------------------------  IN:    Oral Fluid: 1080 mL  Total IN: 1080 mL    OUT:    Voided (mL): 1650 mL  Total OUT: 1650 mL    Total NET: -570 mL        Physical Exam:  GENERAL: Awake, alert and interactive, no acute distress, appears comfortable  NEURO: A&Ox4, no focal deficits, 5/5 strength in all ext, reflexes 2+ throughout  HEENT: Normocephalic, atraumatic, CN2-12 intact, no conjunctivitis or scleral icterus, oral mucosa moist, no oral lesions noted  NECK: Supple, no JVD, no LAD, thyroid not palpable  CARDIAC: Regular rate and rhythm, +S1/S2, no murmurs/rubs/gallops  PULM: Breathing comfortably on RA, clear to auscultation bilaterally, no wheezes/rales/rhonchi, no inspiratory stridor  ABDOMEN: Soft, nontender, nondistended, +bs, no hepatosplenomegaly, no rebound tenderness or fluid wave, no CVA tenderness  : Deferred  MSK: Range of motion grossly intact, no back tenderness  SKIN: Warm and dry, no rashes, no lesions  VASC: 2+ peripheral pulses, no edema  Psych: Appropriate affect    Medications:  MEDICATIONS  (STANDING):  allopurinol 200 milliGRAM(s) Oral daily  amLODIPine   Tablet 5 milliGRAM(s) Oral daily  atorvastatin 80 milliGRAM(s) Oral at bedtime  heparin   Injectable 5000 Unit(s) SubCutaneous every 8 hours  pantoprazole    Tablet 40 milliGRAM(s) Oral before breakfast  predniSONE   Tablet 30 milliGRAM(s) Oral daily  senna 2 Tablet(s) Oral at bedtime    MEDICATIONS  (PRN):  acetaminophen   Tablet .. 975 milliGRAM(s) Oral every 8 hours PRN Mild Pain (1 - 3)  HYDROmorphone   Tablet 2 milliGRAM(s) Oral every 8 hours PRN Severe Pain (7 - 10)  polyethylene glycol 3350 17 Gram(s) Oral daily PRN Constipation      Labs:                        9.0    11.32 )-----------( 300      ( 14 Aug 2021 07:31 )             29.4     08-14    135  |  101  |  44<H>  ----------------------------<  118<H>  4.8   |  26  |  1.94<H>    Ca    9.6      14 Aug 2021 07:31  Phos  2.4     08-14  Mg     2.7     08-14    TPro  8.1  /  Alb  3.3  /  TBili  0.3  /  DBili  x   /  AST  38  /  ALT  41  /  AlkPhos  144<H>  08-13        COVID-19 PCR: NotDetec (11 Aug 2021 21:02)      Radiology: Reviewed Internal Medicine Progress Note  Tracie Jayne, PGY-1  Pager: [phone]    Hospital Course:  56 yo M PMH HTN, CKD, anemia, arthritis p/w b/l knee pain on 8/11 found to have uric acid crystals in L knee. Prednisone 40mg qd started 8/12 for gout flare and pain control for OA flare, now tapering. Pending MARYCARMEN placement.    OVERNIGHT EVENTS/INTERVAL HPI: FAUSTINO o/n. Patient notes improved mobility in his knees, though c/o pain when working with PT. Making good urine. Not c/o CP, SOB, n/v/d/c, dysuria.    OBJECTIVE:  Vital Signs Last 24 Hrs  T(C): 36.8 (14 Aug 2021 04:50), Max: 36.9 (13 Aug 2021 16:51)  T(F): 98.3 (14 Aug 2021 04:50), Max: 98.4 (13 Aug 2021 16:51)  HR: 62 (13 Aug 2021 20:48) (62 - 74)  BP: 168/90 (14 Aug 2021 04:50) (145/78 - 168/90)  BP(mean): 116 (14 Aug 2021 04:50) (116 - 116)  RR: 16 (14 Aug 2021 04:50) (16 - 17)  SpO2: 99% (14 Aug 2021 04:50) (97% - 99%)  I&O's Detail    13 Aug 2021 07:01  -  14 Aug 2021 07:00  --------------------------------------------------------  IN:    Oral Fluid: 1080 mL  Total IN: 1080 mL    OUT:    Voided (mL): 1650 mL  Total OUT: 1650 mL    Total NET: -570 mL      Physical Exam:  GENERAL: Awake, alert and interactive, no acute distress, appears comfortable  NEURO: A&Ox4, no focal deficits  HEENT: Normocephalic, atraumatic, EOMI, oral mucosa moist  NECK: Supple  CARDIAC: Regular rate and rhythm, +S1/S2, no murmurs/rubs/gallops  PULM: Breathing comfortably on RA, clear to auscultation bilaterally, no wheezes/rales/rhonchi  ABDOMEN: Soft, nontender, nondistended, +bs  : Deferred  MSK: B/l knees with mild swelling. Neither knee warm this morning.  SKIN: Warm and dry  VASC: 2+ peripheral pulses, no edema  Psych: Appropriate affect    Medications:  MEDICATIONS  (STANDING):  allopurinol 200 milliGRAM(s) Oral daily  amLODIPine   Tablet 5 milliGRAM(s) Oral daily  atorvastatin 80 milliGRAM(s) Oral at bedtime  heparin   Injectable 5000 Unit(s) SubCutaneous every 8 hours  pantoprazole    Tablet 40 milliGRAM(s) Oral before breakfast  predniSONE   Tablet 30 milliGRAM(s) Oral daily  senna 2 Tablet(s) Oral at bedtime    MEDICATIONS  (PRN):  acetaminophen   Tablet .. 975 milliGRAM(s) Oral every 8 hours PRN Mild Pain (1 - 3)  HYDROmorphone   Tablet 2 milliGRAM(s) Oral every 8 hours PRN Severe Pain (7 - 10)  polyethylene glycol 3350 17 Gram(s) Oral daily PRN Constipation      Labs:                        9.0    11.32 )-----------( 300      ( 14 Aug 2021 07:31 )             29.4     08-14    135  |  101  |  44<H>  ----------------------------<  118<H>  4.8   |  26  |  1.94<H>    Ca    9.6      14 Aug 2021 07:31  Phos  2.4     08-14  Mg     2.7     08-14    TPro  8.1  /  Alb  3.3  /  TBili  0.3  /  DBili  x   /  AST  38  /  ALT  41  /  AlkPhos  144<H>  08-13        COVID-19 PCR: NotDetec (11 Aug 2021 21:02)      Radiology: Reviewed

## 2021-08-14 NOTE — PROGRESS NOTE ADULT - ASSESSMENT
54 yo M PMH HTN, CKD, anemia, arthritis p/w b/l knee pain on 8/11 found to have uric acid crystals in L knee being treated for gout and OA flares, now tapering prednisone and pending MARYCARMEN.

## 2021-08-14 NOTE — DISCHARGE NOTE PROVIDER - CARE PROVIDER_API CALL
Panchito Nunez)  Orthopedics  [address], [address] [facility], [place] [address]  Phone: [phone]  Fax: [phone]  Follow Up Time: 2 weeks

## 2021-08-14 NOTE — DISCHARGE NOTE PROVIDER - NSDCCPCAREPLAN_GEN_ALL_CORE_FT
PRINCIPAL DISCHARGE DIAGNOSIS  Diagnosis: Gout flare  Assessment and Plan of Treatment: You were having pain in your knees when you came in and were found to have uric acid crystals in your left knee. We treated you with steroids. Please take _____(Taper). Please take protonix 40mg daily while on steroids.      SECONDARY DISCHARGE DIAGNOSES  Diagnosis: Osteoarthritis of both knees  Assessment and Plan of Treatment: You were also having pain in your knees due to osteoarthitis. You are going to rehab to work on your strength. You will need knee replacements once your kidney function improves.    Diagnosis: Stage 3 chronic kidney disease  Assessment and Plan of Treatment: Your kidney functuion has improved slightly since you were seen by your nephrologist. AVOID TAKING ANY NSAIDS. Please follow up with your nephrologist.    Diagnosis: Blood alkaline phosphatase increased compared with prior measurement  Assessment and Plan of Treatment: You were noted to have increased alkaline phosphate as well as increased GGT. Both are markers of liver/gallbladder issues. Please follow up with a GI doctor.    Diagnosis: Cardiovascular disease  Assessment and Plan of Treatment: Please follow up with your cardiologist regarding cardiac concerns that need clearance for surgery.     PRINCIPAL DISCHARGE DIAGNOSIS  Diagnosis: Gout flare  Assessment and Plan of Treatment: You were having pain in your knees when you came in and were found to have uric acid crystals in your left knee. We treated you with steroids. Please take _____(Taper). Please take protonix 40mg daily while on steroids.      SECONDARY DISCHARGE DIAGNOSES  Diagnosis: Osteoarthritis of both knees  Assessment and Plan of Treatment: You were also having pain in your knees due to osteoarthitis. You are going to rehab to work on your strength. You will need knee replacements once your kidney function improves.    Diagnosis: Stage 3 chronic kidney disease  Assessment and Plan of Treatment: Your kidney functuion has improved slightly since you were seen by your nephrologist. AVOID TAKING ANY NSAIDS. Please follow up with your nephrologist.    Diagnosis: Blood alkaline phosphatase increased compared with prior measurement  Assessment and Plan of Treatment: You were noted to have increased alkaline phosphate as well as increased GGT. Both are markers of liver/gallbladder issues. Please follow up with a GI doctor.    Diagnosis: Cardiovascular disease  Assessment and Plan of Treatment: Please follow up with your cardiologist regarding cardiac concerns that need clearance for surgery.    Diagnosis: Does not have immunity to COVID-19 virus  Assessment and Plan of Treatment: You received yousif first dose of yousif COVID-19 vaccine on 8/14. Please get the second dose on/around 9/4.     PRINCIPAL DISCHARGE DIAGNOSIS  Diagnosis: Gout flare  Assessment and Plan of Treatment: You were having pain in your knees when you came in and were found to have uric acid crystals in your left knee. We treated you with steroids. Please take 20 mg on 8/16 and 8/17 and then 10mg on 8/18 and 8/19. Please take protonix 40mg daily while on steroids, discontinue 8/20.      SECONDARY DISCHARGE DIAGNOSES  Diagnosis: Osteoarthritis of both knees  Assessment and Plan of Treatment: You were also having pain in your knees due to osteoarthitis. You are going to rehab to work on your strength. You will need knee replacements once your kidney function improves.    Diagnosis: Stage 3 chronic kidney disease  Assessment and Plan of Treatment: Your kidney functuion has improved slightly since you were seen by your nephrologist. AVOID TAKING ANY NSAIDS. Please follow up with your nephrologist.    Diagnosis: Cardiovascular disease  Assessment and Plan of Treatment: Please follow up with your cardiologist regarding cardiac concerns that need clearance for surgery.    Diagnosis: Blood alkaline phosphatase increased compared with prior measurement  Assessment and Plan of Treatment: You were noted to have increased alkaline phosphate as well as increased GGT. Both are markers of liver/gallbladder issues. Please follow up with a GI doctor.    Diagnosis: Does not have immunity to COVID-19 virus  Assessment and Plan of Treatment: You received yousif first dose of yousif COVID-19 vaccine on 8/14. Please get the second dose on/around 9/4.     PRINCIPAL DISCHARGE DIAGNOSIS  Diagnosis: Gout flare  Assessment and Plan of Treatment: You were having pain in your knees when you came in and were found to have uric acid crystals in your left knee. We treated you with steroids. Please take 10mg of prednisone on 8/18 and 8/19 to finish your prednisone taper. Please take protonix 40mg daily while on steroids.      SECONDARY DISCHARGE DIAGNOSES  Diagnosis: Osteoarthritis of both knees  Assessment and Plan of Treatment: You were also having pain in your knees due to osteoarthitis. You are going to rehab to work on your strength. You will need knee replacements once your kidney function improves.    Diagnosis: Stage 3 chronic kidney disease  Assessment and Plan of Treatment: Your kidney functuion has improved slightly since you were seen by your nephrologist. AVOID TAKING ANY NSAIDS or Percocet/Oxycodone. Please follow up with your nephrologist.    Diagnosis: Cardiovascular disease  Assessment and Plan of Treatment: Please follow up with your cardiologist regarding cardiac concerns that need clearance for surgery.    Diagnosis: Does not have immunity to COVID-19 virus  Assessment and Plan of Treatment: You received the first dose of [facility] COVID-19 vaccine on 8/14. Please get the second dose on/around 9/4.

## 2021-08-14 NOTE — DISCHARGE NOTE PROVIDER - NSDCMRMEDTOKEN_GEN_ALL_CORE_FT
allopurinol 100 mg oral tablet: 2 tab(s) orally once a day  amLODIPine 5 mg oral tablet: 1 tab(s) orally once a day  hydrocodone-acetaminophen 5 mg-300 mg oral tablet: 1 tab(s) orally every 6 hours, As Needed  nabumetone 500 mg oral tablet: 1 tab(s) orally every 12 hours, As Needed  rosuvastatin 20 mg oral tablet: 1 tab(s) orally once a day   allopurinol 100 mg oral tablet: 2 tab(s) orally once a day  amLODIPine 5 mg oral tablet: 1 tab(s) orally once a day  hydrocodone-acetaminophen 5 mg-300 mg oral tablet: 1 tab(s) orally every 6 hours, As Needed  pantoprazole 40 mg oral delayed release tablet: 1 tab(s) orally once a day (before a meal)  rosuvastatin 20 mg oral tablet: 1 tab(s) orally once a day   allopurinol: 50 milligram(s) orally once a day  amLODIPine 5 mg oral tablet: 1 tab(s) orally once a day  hydrocodone-acetaminophen 5 mg-300 mg oral tablet: 1 tab(s) orally every 6 hours, As Needed  pantoprazole 40 mg oral delayed release tablet: 1 tab(s) orally once a day (before a meal)  polyethylene glycol 3350 oral powder for reconstitution: 17 gram(s) orally once a day, As needed, Constipation  predniSONE 10 mg oral tablet: 1 tab(s) orally once a day  rosuvastatin 20 mg oral tablet: 1 tab(s) orally once a day  senna oral tablet: 2 tab(s) orally once a day (at bedtime)

## 2021-08-15 LAB
ANION GAP SERPL CALC-SCNC: 8 MMOL/L — SIGNIFICANT CHANGE UP (ref 5–17)
BLD GP AB SCN SERPL QL: NEGATIVE — SIGNIFICANT CHANGE UP
BUN SERPL-MCNC: 48 MG/DL — HIGH (ref 7–23)
CALCIUM SERPL-MCNC: 9.1 MG/DL — SIGNIFICANT CHANGE UP (ref 8.4–10.5)
CHLORIDE SERPL-SCNC: 103 MMOL/L — SIGNIFICANT CHANGE UP (ref 96–108)
CO2 SERPL-SCNC: 25 MMOL/L — SIGNIFICANT CHANGE UP (ref 22–31)
CREAT SERPL-MCNC: 1.89 MG/DL — HIGH (ref 0.5–1.3)
GLUCOSE SERPL-MCNC: 116 MG/DL — HIGH (ref 70–99)
HCT VFR BLD CALC: 30.2 % — LOW (ref 39–50)
HGB BLD-MCNC: 9.4 G/DL — LOW (ref 13–17)
MAGNESIUM SERPL-MCNC: 2.4 MG/DL — SIGNIFICANT CHANGE UP (ref 1.6–2.6)
MCHC RBC-ENTMCNC: 25.4 PG — LOW (ref 27–34)
MCHC RBC-ENTMCNC: 31.1 GM/DL — LOW (ref 32–36)
MCV RBC AUTO: 81.6 FL — SIGNIFICANT CHANGE UP (ref 80–100)
NRBC # BLD: 0 /100 WBCS — SIGNIFICANT CHANGE UP (ref 0–0)
PHOSPHATE SERPL-MCNC: 2 MG/DL — LOW (ref 2.5–4.5)
PLATELET # BLD AUTO: 295 K/UL — SIGNIFICANT CHANGE UP (ref 150–400)
POTASSIUM SERPL-MCNC: 4.6 MMOL/L — SIGNIFICANT CHANGE UP (ref 3.5–5.3)
POTASSIUM SERPL-SCNC: 4.6 MMOL/L — SIGNIFICANT CHANGE UP (ref 3.5–5.3)
RBC # BLD: 3.7 M/UL — LOW (ref 4.2–5.8)
RBC # FLD: 13.9 % — SIGNIFICANT CHANGE UP (ref 10.3–14.5)
RH IG SCN BLD-IMP: POSITIVE — SIGNIFICANT CHANGE UP
SODIUM SERPL-SCNC: 136 MMOL/L — SIGNIFICANT CHANGE UP (ref 135–145)
WBC # BLD: 9.88 K/UL — SIGNIFICANT CHANGE UP (ref 3.8–10.5)
WBC # FLD AUTO: 9.88 K/UL — SIGNIFICANT CHANGE UP (ref 3.8–10.5)

## 2021-08-15 PROCEDURE — 99233 SBSQ HOSP IP/OBS HIGH 50: CPT | Mod: GC

## 2021-08-15 RX ORDER — SODIUM,POTASSIUM PHOSPHATES 278-250MG
1 POWDER IN PACKET (EA) ORAL
Refills: 0 | Status: COMPLETED | OUTPATIENT
Start: 2021-08-15 | End: 2021-08-15

## 2021-08-15 RX ORDER — ALLOPURINOL 300 MG
50 TABLET ORAL DAILY
Refills: 0 | Status: DISCONTINUED | OUTPATIENT
Start: 2021-08-15 | End: 2021-08-17

## 2021-08-15 RX ADMIN — Medication 1 PACKET(S): at 13:22

## 2021-08-15 RX ADMIN — Medication 1 PACKET(S): at 18:11

## 2021-08-15 RX ADMIN — Medication 975 MILLIGRAM(S): at 11:40

## 2021-08-15 RX ADMIN — ATORVASTATIN CALCIUM 80 MILLIGRAM(S): 80 TABLET, FILM COATED ORAL at 21:26

## 2021-08-15 RX ADMIN — HYDROMORPHONE HYDROCHLORIDE 2 MILLIGRAM(S): 2 INJECTION INTRAMUSCULAR; INTRAVENOUS; SUBCUTANEOUS at 11:40

## 2021-08-15 RX ADMIN — HEPARIN SODIUM 5000 UNIT(S): 5000 INJECTION INTRAVENOUS; SUBCUTANEOUS at 21:27

## 2021-08-15 RX ADMIN — Medication 30 MILLIGRAM(S): at 06:15

## 2021-08-15 RX ADMIN — PANTOPRAZOLE SODIUM 40 MILLIGRAM(S): 20 TABLET, DELAYED RELEASE ORAL at 06:15

## 2021-08-15 RX ADMIN — Medication 1 PACKET(S): at 15:11

## 2021-08-15 RX ADMIN — HYDROMORPHONE HYDROCHLORIDE 2 MILLIGRAM(S): 2 INJECTION INTRAMUSCULAR; INTRAVENOUS; SUBCUTANEOUS at 10:46

## 2021-08-15 RX ADMIN — AMLODIPINE BESYLATE 5 MILLIGRAM(S): 2.5 TABLET ORAL at 06:14

## 2021-08-15 RX ADMIN — HEPARIN SODIUM 5000 UNIT(S): 5000 INJECTION INTRAVENOUS; SUBCUTANEOUS at 15:11

## 2021-08-15 RX ADMIN — Medication 200 MILLIGRAM(S): at 06:15

## 2021-08-15 RX ADMIN — Medication 975 MILLIGRAM(S): at 10:46

## 2021-08-15 RX ADMIN — HEPARIN SODIUM 5000 UNIT(S): 5000 INJECTION INTRAVENOUS; SUBCUTANEOUS at 06:15

## 2021-08-15 NOTE — PROGRESS NOTE ADULT - SUBJECTIVE AND OBJECTIVE BOX
Patient is a 55y old  Male who presents with a chief complaint of weakness (14 Aug 2021 16:59)      INTERVAL HPI/OVERNIGHT EVENTS:    Pt. seen and examined earlier today  Pt. feels well, reports B/L knee pain has improved  No F/C    Review of Systems: 12 point review of systems otherwise negative    MEDICATIONS  (STANDING):  allopurinol 50 milliGRAM(s) Oral daily  amLODIPine   Tablet 5 milliGRAM(s) Oral daily  atorvastatin 80 milliGRAM(s) Oral at bedtime  heparin   Injectable 5000 Unit(s) SubCutaneous every 8 hours  pantoprazole    Tablet 40 milliGRAM(s) Oral before breakfast  potassium phosphate / sodium phosphate Powder (PHOS-NaK) 1 Packet(s) Oral three times a day with meals  senna 2 Tablet(s) Oral at bedtime    MEDICATIONS  (PRN):  acetaminophen   Tablet .. 975 milliGRAM(s) Oral every 8 hours PRN Mild Pain (1 - 3)  HYDROmorphone   Tablet 2 milliGRAM(s) Oral every 8 hours PRN Severe Pain (7 - 10)  polyethylene glycol 3350 17 Gram(s) Oral daily PRN Constipation      Allergies    No Known Allergies    Intolerances          Vital Signs Last 24 Hrs  T(C): 36.7 (15 Aug 2021 09:35), Max: 36.7 (14 Aug 2021 16:35)  T(F): 98.1 (15 Aug 2021 09:35), Max: 98.1 (14 Aug 2021 16:35)  HR: 69 (15 Aug 2021 09:35) (62 - 73)  BP: 151/82 (15 Aug 2021 09:35) (135/77 - 151/82)  BP(mean): --  RR: 16 (15 Aug 2021 09:35) (16 - 18)  SpO2: 100% (15 Aug 2021 09:35) (98% - 100%)  CAPILLARY BLOOD GLUCOSE          08-14 @ 07:01  -  08-15 @ 07:00  --------------------------------------------------------  IN: 1510 mL / OUT: 3200 mL / NET: -1690 mL    08-15 @ 07:01  -  08-15 @ 15:07  --------------------------------------------------------  IN: 300 mL / OUT: 500 mL / NET: -200 mL        Physical Exam:  (earlier today)  Daily     Daily   General:  comfortable-appearing in NAD  HEENT:  MMM  Extremities: B/L knee swelling  Skin:  WWP  Neuro:  AAOx3    LABS:                        9.4    9.88  )-----------( 295      ( 15 Aug 2021 06:55 )             30.2     08-15    136  |  103  |  48<H>  ----------------------------<  116<H>  4.6   |  25  |  1.89<H>    Ca    9.1      15 Aug 2021 06:55  Phos  2.0     08-15  Mg     2.4     08-15

## 2021-08-16 DIAGNOSIS — R33.9 RETENTION OF URINE, UNSPECIFIED: ICD-10-CM

## 2021-08-16 LAB
ANION GAP SERPL CALC-SCNC: 9 MMOL/L — SIGNIFICANT CHANGE UP (ref 5–17)
BUN SERPL-MCNC: 49 MG/DL — HIGH (ref 7–23)
CALCIUM SERPL-MCNC: 9.4 MG/DL — SIGNIFICANT CHANGE UP (ref 8.4–10.5)
CHLORIDE SERPL-SCNC: 102 MMOL/L — SIGNIFICANT CHANGE UP (ref 96–108)
CO2 SERPL-SCNC: 25 MMOL/L — SIGNIFICANT CHANGE UP (ref 22–31)
CREAT SERPL-MCNC: 1.88 MG/DL — HIGH (ref 0.5–1.3)
GLUCOSE SERPL-MCNC: 119 MG/DL — HIGH (ref 70–99)
HCT VFR BLD CALC: 29.4 % — LOW (ref 39–50)
HGB BLD-MCNC: 9.1 G/DL — LOW (ref 13–17)
MAGNESIUM SERPL-MCNC: 2.4 MG/DL — SIGNIFICANT CHANGE UP (ref 1.6–2.6)
MCHC RBC-ENTMCNC: 24.8 PG — LOW (ref 27–34)
MCHC RBC-ENTMCNC: 31 GM/DL — LOW (ref 32–36)
MCV RBC AUTO: 80.1 FL — SIGNIFICANT CHANGE UP (ref 80–100)
NRBC # BLD: 0 /100 WBCS — SIGNIFICANT CHANGE UP (ref 0–0)
PHOSPHATE SERPL-MCNC: 2.2 MG/DL — LOW (ref 2.5–4.5)
PLATELET # BLD AUTO: 294 K/UL — SIGNIFICANT CHANGE UP (ref 150–400)
POTASSIUM SERPL-MCNC: 5 MMOL/L — SIGNIFICANT CHANGE UP (ref 3.5–5.3)
POTASSIUM SERPL-SCNC: 5 MMOL/L — SIGNIFICANT CHANGE UP (ref 3.5–5.3)
RBC # BLD: 3.67 M/UL — LOW (ref 4.2–5.8)
RBC # FLD: 13.8 % — SIGNIFICANT CHANGE UP (ref 10.3–14.5)
SODIUM SERPL-SCNC: 136 MMOL/L — SIGNIFICANT CHANGE UP (ref 135–145)
WBC # BLD: 12.68 K/UL — HIGH (ref 3.8–10.5)
WBC # FLD AUTO: 12.68 K/UL — HIGH (ref 3.8–10.5)

## 2021-08-16 PROCEDURE — 99233 SBSQ HOSP IP/OBS HIGH 50: CPT | Mod: GC

## 2021-08-16 RX ADMIN — HEPARIN SODIUM 5000 UNIT(S): 5000 INJECTION INTRAVENOUS; SUBCUTANEOUS at 22:51

## 2021-08-16 RX ADMIN — Medication 50 MILLIGRAM(S): at 11:58

## 2021-08-16 RX ADMIN — SENNA PLUS 2 TABLET(S): 8.6 TABLET ORAL at 22:51

## 2021-08-16 RX ADMIN — ATORVASTATIN CALCIUM 80 MILLIGRAM(S): 80 TABLET, FILM COATED ORAL at 22:51

## 2021-08-16 RX ADMIN — HEPARIN SODIUM 5000 UNIT(S): 5000 INJECTION INTRAVENOUS; SUBCUTANEOUS at 05:00

## 2021-08-16 RX ADMIN — HYDROMORPHONE HYDROCHLORIDE 2 MILLIGRAM(S): 2 INJECTION INTRAMUSCULAR; INTRAVENOUS; SUBCUTANEOUS at 23:59

## 2021-08-16 RX ADMIN — Medication 975 MILLIGRAM(S): at 04:47

## 2021-08-16 RX ADMIN — HYDROMORPHONE HYDROCHLORIDE 2 MILLIGRAM(S): 2 INJECTION INTRAMUSCULAR; INTRAVENOUS; SUBCUTANEOUS at 22:56

## 2021-08-16 RX ADMIN — Medication 975 MILLIGRAM(S): at 05:15

## 2021-08-16 RX ADMIN — HEPARIN SODIUM 5000 UNIT(S): 5000 INJECTION INTRAVENOUS; SUBCUTANEOUS at 13:13

## 2021-08-16 RX ADMIN — AMLODIPINE BESYLATE 5 MILLIGRAM(S): 2.5 TABLET ORAL at 05:00

## 2021-08-16 RX ADMIN — Medication 62.5 MILLIMOLE(S): at 16:27

## 2021-08-16 RX ADMIN — PANTOPRAZOLE SODIUM 40 MILLIGRAM(S): 20 TABLET, DELAYED RELEASE ORAL at 05:00

## 2021-08-16 RX ADMIN — Medication 20 MILLIGRAM(S): at 05:00

## 2021-08-16 NOTE — PROGRESS NOTE ADULT - PROBLEM SELECTOR PLAN 4
-c/w home amlodipine 5mg
monitor BMP, renally-dose rx
-c/w home amlodipine 5mg
monitor BMP, renally-dose rx
Outpatient nephrologist c/f NSAID related injury. Was CKD 4 outpatient, now improving.  -trend creatinine  -avoid nephrotic agents (NSAIDS or colchicine)

## 2021-08-16 NOTE — PROGRESS NOTE ADULT - PROBLEM SELECTOR PLAN 6
cards o/p note with c/f SARTHAK in V1 and V2  -f/u cards outpatient
cards o/p note with c/f SARTHAK in V1 and V2  -f/u outpatient
GGT elevated as well  -f/u PCP/GI outpatient

## 2021-08-16 NOTE — PROGRESS NOTE ADULT - PROBLEM SELECTOR PLAN 1
L knee with uric acid crystals. Cannot use colchicine or NSAIDs given CKD.  -prednisone taper continues with 20mg for today and tomorrow, then 10mg for 2d.   -protonix 40mg qd until off steroids (last day 8/19)  -decreased allopurinol to 50mg qd yesterday for renal dosing.

## 2021-08-16 NOTE — PROGRESS NOTE ADULT - ATTENDING COMMENTS
55M with CKD, severe OA b/l knees, Gout who presented with acute worsening knee pain likely 2/2 Gout flare and worsening of OA. Continuing to improve on steroids. Wishing to pursue Rehab at this time. Medically optimized for discharge will complete course of steroids outpatient.
55M with CKD, severe OA b/l knees, Gout who presented with acute worsening knee pain likely 2/2 Gout flare and worsening of OA. O/N improvement in pain. Patient evaluated yesterday for MARYCARMEN. Wishing to pursue Rehab at this time. Medically optimized for discharge will complete course of steroids outpatient.

## 2021-08-16 NOTE — PROGRESS NOTE ADULT - SUBJECTIVE AND OBJECTIVE BOX
Internal Medicine Progress Note  Tracie Jayne, PGY-1  Pager: [phone]    ******INCOMPLETE******    Patient is a 55y old  Male who presents with a chief complaint of weakness (15 Aug 2021 15:04)    OVERNIGHT EVENTS/INTERVAL HPI:    OBJECTIVE:  Vital Signs Last 24 Hrs  T(C): 36.9 (16 Aug 2021 08:28), Max: 36.9 (15 Aug 2021 20:12)  T(F): 98.4 (16 Aug 2021 08:28), Max: 98.5 (15 Aug 2021 20:12)  HR: 62 (16 Aug 2021 08:28) (62 - 74)  BP: 161/91 (16 Aug 2021 08:28) (142/81 - 162/92)  BP(mean): --  RR: 17 (16 Aug 2021 08:28) (17 - 17)  SpO2: 97% (16 Aug 2021 08:28) (95% - 99%)  I&O's Detail    15 Aug 2021 07:01  -  16 Aug 2021 07:00  --------------------------------------------------------  IN:    Oral Fluid: 620 mL  Total IN: 620 mL    OUT:    Voided (mL): 1570 mL  Total OUT: 1570 mL    Total NET: -950 mL      16 Aug 2021 07:01  -  16 Aug 2021 12:07  --------------------------------------------------------  IN:    Oral Fluid: 200 mL  Total IN: 200 mL    OUT:    Voided (mL): 650 mL  Total OUT: 650 mL    Total NET: -450 mL        Physical Exam:  GENERAL: Awake, alert and interactive, no acute distress, appears comfortable  NEURO: A&Ox4, no focal deficits, 5/5 strength in all ext, reflexes 2+ throughout  HEENT: Normocephalic, atraumatic, CN2-12 intact, no conjunctivitis or scleral icterus, oral mucosa moist, no oral lesions noted  NECK: Supple, no JVD, no LAD, thyroid not palpable  CARDIAC: Regular rate and rhythm, +S1/S2, no murmurs/rubs/gallops  PULM: Breathing comfortably on RA, clear to auscultation bilaterally, no wheezes/rales/rhonchi, no inspiratory stridor  ABDOMEN: Soft, nontender, nondistended, +bs, no hepatosplenomegaly, no rebound tenderness or fluid wave, no CVA tenderness  : Deferred  MSK: Range of motion grossly intact, no back tenderness  SKIN: Warm and dry, no rashes, no lesions  VASC: 2+ peripheral pulses, no edema  Psych: Appropriate affect    Medications:  MEDICATIONS  (STANDING):  allopurinol 50 milliGRAM(s) Oral daily  amLODIPine   Tablet 5 milliGRAM(s) Oral daily  atorvastatin 80 milliGRAM(s) Oral at bedtime  heparin   Injectable 5000 Unit(s) SubCutaneous every 8 hours  pantoprazole    Tablet 40 milliGRAM(s) Oral before breakfast  predniSONE   Tablet 20 milliGRAM(s) Oral daily  senna 2 Tablet(s) Oral at bedtime    MEDICATIONS  (PRN):  acetaminophen   Tablet .. 975 milliGRAM(s) Oral every 8 hours PRN Mild Pain (1 - 3)  HYDROmorphone   Tablet 2 milliGRAM(s) Oral every 8 hours PRN Severe Pain (7 - 10)  polyethylene glycol 3350 17 Gram(s) Oral daily PRN Constipation      Labs:                        9.1    12.68 )-----------( 294      ( 16 Aug 2021 06:24 )             29.4     08-16    136  |  102  |  49<H>  ----------------------------<  119<H>  5.0   |  25  |  1.88<H>    Ca    9.4      16 Aug 2021 06:24  Phos  2.0     08-15  Mg     2.4     08-16          COVID-19 PCR: NotDetec (11 Aug 2021 21:02)      Radiology: Reviewed Internal Medicine Progress Note  Tracie Godoy, PGY-1  Pager: [phone]    Hospital Course:  56 yo M PMH HTN, CKD, anemia, arthritis p/w b/l knee pain on 8/11 found to have uric acid crystals in L knee. Prednisone 40mg qd started 8/12 for gout flare and pain control for OA flare, now tapering. Pending MARYCARMEN placement.    OVERNIGHT EVENTS/INTERVAL HPI: FAUSTINO o/n. This AM, patient states knee pain improved and better mobility. He is c/o feeling like he is not voiding completely. Not c/o dysuria, CP, SOB, abd pain, or n/v/d/c.    OBJECTIVE:  Vital Signs Last 24 Hrs  T(C): 36.9 (16 Aug 2021 08:28), Max: 36.9 (15 Aug 2021 20:12)  T(F): 98.4 (16 Aug 2021 08:28), Max: 98.5 (15 Aug 2021 20:12)  HR: 62 (16 Aug 2021 08:28) (62 - 74)  BP: 161/91 (16 Aug 2021 08:28) (142/81 - 162/92)  BP(mean): --  RR: 17 (16 Aug 2021 08:28) (17 - 17)  SpO2: 97% (16 Aug 2021 08:28) (95% - 99%)  I&O's Detail    15 Aug 2021 07:01  -  16 Aug 2021 07:00  --------------------------------------------------------  IN:    Oral Fluid: 620 mL  Total IN: 620 mL    OUT:    Voided (mL): 1570 mL  Total OUT: 1570 mL    Total NET: -950 mL      16 Aug 2021 07:01  -  16 Aug 2021 12:07  --------------------------------------------------------  IN:    Oral Fluid: 200 mL  Total IN: 200 mL    OUT:    Voided (mL): 650 mL  Total OUT: 650 mL    Total NET: -450 mL      Physical Exam:  GENERAL: Awake, alert and interactive, no acute distress, appears comfortable  NEURO: A&Ox4, no focal deficits  HEENT: Normocephalic, atraumatic, EOMI, oral mucosa moist  NECK: Supple  CARDIAC: Regular rate and rhythm, +S1/S2, no murmurs/rubs/gallops  PULM: Breathing comfortably on RA, clear to auscultation bilaterally, no wheezes/rales/rhonchi  ABDOMEN: Soft, nontender, nondistended, +bs  : Deferred  MSK: B/l knees with mild swelling and without warmth. ROM at knees improved. Myoclonic jerks of R calf after movement.  SKIN: Warm and dry  VASC: 2+ peripheral pulses, no edema  Psych: Appropriate affect    Medications:  MEDICATIONS  (STANDING):  allopurinol 50 milliGRAM(s) Oral daily  amLODIPine   Tablet 5 milliGRAM(s) Oral daily  atorvastatin 80 milliGRAM(s) Oral at bedtime  heparin   Injectable 5000 Unit(s) SubCutaneous every 8 hours  pantoprazole    Tablet 40 milliGRAM(s) Oral before breakfast  predniSONE   Tablet 20 milliGRAM(s) Oral daily  senna 2 Tablet(s) Oral at bedtime    MEDICATIONS  (PRN):  acetaminophen   Tablet .. 975 milliGRAM(s) Oral every 8 hours PRN Mild Pain (1 - 3)  HYDROmorphone   Tablet 2 milliGRAM(s) Oral every 8 hours PRN Severe Pain (7 - 10)  polyethylene glycol 3350 17 Gram(s) Oral daily PRN Constipation      Labs:                        9.1    12.68 )-----------( 294      ( 16 Aug 2021 06:24 )             29.4     08-16    136  |  102  |  49<H>  ----------------------------<  119<H>  5.0   |  25  |  1.88<H>    Ca    9.4      16 Aug 2021 06:24  Phos  2.0     08-15  Mg     2.4     08-16          COVID-19 PCR: NotDetec (11 Aug 2021 21:02)      Radiology: Reviewed

## 2021-08-16 NOTE — PROGRESS NOTE ADULT - PROBLEM SELECTOR PLAN 5
GGT elevated as well  -f/u PCP/GI outpatient
GGT elevated  -f/u PCP/GI outpatient
-c/w home amlodipine 5mg

## 2021-08-16 NOTE — PROGRESS NOTE ADULT - PROBLEM SELECTOR PROBLEM 5
Hypertension
Blood alkaline phosphatase increased compared with prior measurement
Blood alkaline phosphatase increased compared with prior measurement

## 2021-08-16 NOTE — PROGRESS NOTE ADULT - PROBLEM SELECTOR PROBLEM 6
Cardiovascular disease
Blood alkaline phosphatase increased compared with prior measurement
Cardiovascular disease

## 2021-08-16 NOTE — PROGRESS NOTE ADULT - PROBLEM SELECTOR PLAN 2
Severe b/l OA 2/2 repeated trauma from exercise. Pain has gotten worse the last couple of weeks, pt cannot ambulate. Pain improving on current regimen. Myoclonic jerks of R calf after movement.  -PT recommended MARYCARMEN  -Pain regimen: Tylenol 975 q8h prn for mild pain, Dilaudid PO 2mg q8h prn for severe pain

## 2021-08-16 NOTE — PROGRESS NOTE ADULT - PROBLEM SELECTOR PLAN 7
F: None   E: Replete for K<4 Mag<2  N: Regular Diet  A: heparin  GI: protonix, senna, miralax  Code status: Full  Disposition: Pending MARYCARMEN, needs nephro, GI, and cards f/u  Will give COVID-19 vaccine if available
cards o/p note with c/f SARTHAK in V1 and V2  -f/u cards outpatient
F: None   E: Replete for K<4 Mag<2  N: Regular Diet  A: heparin  GI: protonix, senna, miralax  Code status: Full  Disposition: Pending M[facility]  Will give COVID-19 vaccine if available

## 2021-08-16 NOTE — PROGRESS NOTE ADULT - PROBLEM SELECTOR PLAN 8
F: None   E: Replete for K<4 Mag<2  N: Regular Diet  A: heparin  GI: protonix, senna, miralax  Code status: Full  Disposition: Pending MARYCARMEN, needs nephro, GI, and cards f/u

## 2021-08-16 NOTE — PROGRESS NOTE ADULT - PROBLEM SELECTOR PROBLEM 4
Stage 3 chronic kidney disease
Hypertension
Stage 3 chronic kidney disease
Stage 3 chronic kidney disease
Hypertension

## 2021-08-17 ENCOUNTER — TRANSCRIPTION ENCOUNTER (OUTPATIENT)
Age: 56
End: 2021-08-17

## 2021-08-17 VITALS
TEMPERATURE: 98 F | RESPIRATION RATE: 18 BRPM | HEART RATE: 78 BPM | SYSTOLIC BLOOD PRESSURE: 133 MMHG | OXYGEN SATURATION: 98 % | DIASTOLIC BLOOD PRESSURE: 79 MMHG

## 2021-08-17 LAB
ANION GAP SERPL CALC-SCNC: 9 MMOL/L — SIGNIFICANT CHANGE UP (ref 5–17)
ANISOCYTOSIS BLD QL: SLIGHT — SIGNIFICANT CHANGE UP
BASO STIPL BLD QL SMEAR: PRESENT — SIGNIFICANT CHANGE UP
BASOPHILS # BLD AUTO: 0.12 K/UL — SIGNIFICANT CHANGE UP (ref 0–0.2)
BASOPHILS NFR BLD AUTO: 0.9 % — SIGNIFICANT CHANGE UP (ref 0–2)
BUN SERPL-MCNC: 49 MG/DL — HIGH (ref 7–23)
CALCIUM SERPL-MCNC: 9.4 MG/DL — SIGNIFICANT CHANGE UP (ref 8.4–10.5)
CHLORIDE SERPL-SCNC: 102 MMOL/L — SIGNIFICANT CHANGE UP (ref 96–108)
CO2 SERPL-SCNC: 25 MMOL/L — SIGNIFICANT CHANGE UP (ref 22–31)
CREAT SERPL-MCNC: 1.85 MG/DL — HIGH (ref 0.5–1.3)
DACRYOCYTES BLD QL SMEAR: SLIGHT — SIGNIFICANT CHANGE UP
ELLIPTOCYTES BLD QL SMEAR: SLIGHT — SIGNIFICANT CHANGE UP
EOSINOPHIL # BLD AUTO: 0.12 K/UL — SIGNIFICANT CHANGE UP (ref 0–0.5)
EOSINOPHIL NFR BLD AUTO: 0.9 % — SIGNIFICANT CHANGE UP (ref 0–6)
GIANT PLATELETS BLD QL SMEAR: PRESENT — SIGNIFICANT CHANGE UP
GLUCOSE SERPL-MCNC: 112 MG/DL — HIGH (ref 70–99)
HCT VFR BLD CALC: 30.6 % — LOW (ref 39–50)
HGB BLD-MCNC: 9.5 G/DL — LOW (ref 13–17)
HYPOCHROMIA BLD QL: SLIGHT — SIGNIFICANT CHANGE UP
LYMPHOCYTES # BLD AUTO: 24.6 % — SIGNIFICANT CHANGE UP (ref 13–44)
LYMPHOCYTES # BLD AUTO: 3.2 K/UL — SIGNIFICANT CHANGE UP (ref 1–3.3)
MACROCYTES BLD QL: SLIGHT — SIGNIFICANT CHANGE UP
MAGNESIUM SERPL-MCNC: 2.2 MG/DL — SIGNIFICANT CHANGE UP (ref 1.6–2.6)
MANUAL SMEAR VERIFICATION: SIGNIFICANT CHANGE UP
MCHC RBC-ENTMCNC: 25.1 PG — LOW (ref 27–34)
MCHC RBC-ENTMCNC: 31 GM/DL — LOW (ref 32–36)
MCV RBC AUTO: 80.7 FL — SIGNIFICANT CHANGE UP (ref 80–100)
METAMYELOCYTES # FLD: 0.9 % — HIGH (ref 0–0)
MICROCYTES BLD QL: SLIGHT — SIGNIFICANT CHANGE UP
MONOCYTES # BLD AUTO: 0.46 K/UL — SIGNIFICANT CHANGE UP (ref 0–0.9)
MONOCYTES NFR BLD AUTO: 3.5 % — SIGNIFICANT CHANGE UP (ref 2–14)
MYELOCYTES NFR BLD: 2.6 % — HIGH (ref 0–0)
NEUTROPHILS # BLD AUTO: 8.66 K/UL — HIGH (ref 1.8–7.4)
NEUTROPHILS NFR BLD AUTO: 64.9 % — SIGNIFICANT CHANGE UP (ref 43–77)
NEUTS BAND # BLD: 1.7 % — SIGNIFICANT CHANGE UP (ref 0–8)
NRBC # BLD: 1 /100 — HIGH (ref 0–0)
NRBC # BLD: SIGNIFICANT CHANGE UP /100 WBCS (ref 0–0)
PHOSPHATE SERPL-MCNC: 2.6 MG/DL — SIGNIFICANT CHANGE UP (ref 2.5–4.5)
PLAT MORPH BLD: ABNORMAL
PLATELET # BLD AUTO: 321 K/UL — SIGNIFICANT CHANGE UP (ref 150–400)
POIKILOCYTOSIS BLD QL AUTO: SLIGHT — SIGNIFICANT CHANGE UP
POLYCHROMASIA BLD QL SMEAR: SLIGHT — SIGNIFICANT CHANGE UP
POTASSIUM SERPL-MCNC: 4.5 MMOL/L — SIGNIFICANT CHANGE UP (ref 3.5–5.3)
POTASSIUM SERPL-SCNC: 4.5 MMOL/L — SIGNIFICANT CHANGE UP (ref 3.5–5.3)
RBC # BLD: 3.79 M/UL — LOW (ref 4.2–5.8)
RBC # FLD: 14.1 % — SIGNIFICANT CHANGE UP (ref 10.3–14.5)
RBC BLD AUTO: ABNORMAL
SCHISTOCYTES BLD QL AUTO: SLIGHT — SIGNIFICANT CHANGE UP
SODIUM SERPL-SCNC: 136 MMOL/L — SIGNIFICANT CHANGE UP (ref 135–145)
WBC # BLD: 13.01 K/UL — HIGH (ref 3.8–10.5)
WBC # FLD AUTO: 13.01 K/UL — HIGH (ref 3.8–10.5)

## 2021-08-17 PROCEDURE — 84100 ASSAY OF PHOSPHORUS: CPT

## 2021-08-17 PROCEDURE — 85027 COMPLETE CBC AUTOMATED: CPT

## 2021-08-17 PROCEDURE — 89060 EXAM SYNOVIAL FLUID CRYSTALS: CPT

## 2021-08-17 PROCEDURE — 86900 BLOOD TYPING SEROLOGIC ABO: CPT

## 2021-08-17 PROCEDURE — U0005: CPT

## 2021-08-17 PROCEDURE — 86140 C-REACTIVE PROTEIN: CPT

## 2021-08-17 PROCEDURE — 97530 THERAPEUTIC ACTIVITIES: CPT

## 2021-08-17 PROCEDURE — 82803 BLOOD GASES ANY COMBINATION: CPT

## 2021-08-17 PROCEDURE — 85025 COMPLETE CBC W/AUTO DIFF WBC: CPT

## 2021-08-17 PROCEDURE — 85652 RBC SED RATE AUTOMATED: CPT

## 2021-08-17 PROCEDURE — 80048 BASIC METABOLIC PNL TOTAL CA: CPT

## 2021-08-17 PROCEDURE — 89051 BODY FLUID CELL COUNT: CPT

## 2021-08-17 PROCEDURE — 84132 ASSAY OF SERUM POTASSIUM: CPT

## 2021-08-17 PROCEDURE — 82330 ASSAY OF CALCIUM: CPT

## 2021-08-17 PROCEDURE — U0003: CPT

## 2021-08-17 PROCEDURE — 96374 THER/PROPH/DIAG INJ IV PUSH: CPT

## 2021-08-17 PROCEDURE — 86901 BLOOD TYPING SEROLOGIC RH(D): CPT

## 2021-08-17 PROCEDURE — 87070 CULTURE OTHR SPECIMN AEROBIC: CPT

## 2021-08-17 PROCEDURE — 86850 RBC ANTIBODY SCREEN: CPT

## 2021-08-17 PROCEDURE — 86803 HEPATITIS C AB TEST: CPT

## 2021-08-17 PROCEDURE — 83735 ASSAY OF MAGNESIUM: CPT

## 2021-08-17 PROCEDURE — 99285 EMERGENCY DEPT VISIT HI MDM: CPT

## 2021-08-17 PROCEDURE — 97161 PT EVAL LOW COMPLEX 20 MIN: CPT

## 2021-08-17 PROCEDURE — 80053 COMPREHEN METABOLIC PANEL: CPT

## 2021-08-17 PROCEDURE — 86769 SARS-COV-2 COVID-19 ANTIBODY: CPT

## 2021-08-17 PROCEDURE — 99239 HOSP IP/OBS DSCHRG MGMT >30: CPT | Mod: GC

## 2021-08-17 PROCEDURE — 84295 ASSAY OF SERUM SODIUM: CPT

## 2021-08-17 PROCEDURE — 82977 ASSAY OF GGT: CPT

## 2021-08-17 PROCEDURE — 87075 CULTR BACTERIA EXCEPT BLOOD: CPT

## 2021-08-17 PROCEDURE — 87205 SMEAR GRAM STAIN: CPT

## 2021-08-17 PROCEDURE — 97116 GAIT TRAINING THERAPY: CPT

## 2021-08-17 PROCEDURE — 73564 X-RAY EXAM KNEE 4 OR MORE: CPT

## 2021-08-17 PROCEDURE — 36415 COLL VENOUS BLD VENIPUNCTURE: CPT

## 2021-08-17 RX ORDER — ALLOPURINOL 300 MG
2 TABLET ORAL
Qty: 0 | Refills: 0 | DISCHARGE

## 2021-08-17 RX ORDER — SENNA PLUS 8.6 MG/1
2 TABLET ORAL
Qty: 0 | Refills: 0 | DISCHARGE
Start: 2021-08-17

## 2021-08-17 RX ORDER — ALLOPURINOL 300 MG
50 TABLET ORAL
Qty: 0 | Refills: 0 | DISCHARGE
Start: 2021-08-17

## 2021-08-17 RX ORDER — POLYETHYLENE GLYCOL 3350 17 G/17G
17 POWDER, FOR SOLUTION ORAL
Qty: 0 | Refills: 0 | DISCHARGE
Start: 2021-08-17

## 2021-08-17 RX ADMIN — HYDROMORPHONE HYDROCHLORIDE 2 MILLIGRAM(S): 2 INJECTION INTRAMUSCULAR; INTRAVENOUS; SUBCUTANEOUS at 07:09

## 2021-08-17 RX ADMIN — POLYETHYLENE GLYCOL 3350 17 GRAM(S): 17 POWDER, FOR SOLUTION ORAL at 05:51

## 2021-08-17 RX ADMIN — HEPARIN SODIUM 5000 UNIT(S): 5000 INJECTION INTRAVENOUS; SUBCUTANEOUS at 05:41

## 2021-08-17 RX ADMIN — PANTOPRAZOLE SODIUM 40 MILLIGRAM(S): 20 TABLET, DELAYED RELEASE ORAL at 05:41

## 2021-08-17 RX ADMIN — AMLODIPINE BESYLATE 5 MILLIGRAM(S): 2.5 TABLET ORAL at 05:41

## 2021-08-17 RX ADMIN — Medication 20 MILLIGRAM(S): at 05:41

## 2021-08-17 RX ADMIN — Medication 50 MILLIGRAM(S): at 13:26

## 2021-08-17 RX ADMIN — HEPARIN SODIUM 5000 UNIT(S): 5000 INJECTION INTRAVENOUS; SUBCUTANEOUS at 13:26

## 2021-08-17 NOTE — PROGRESS NOTE ADULT - PROVIDER SPECIALTY LIST ADULT
Hospitalist
Internal Medicine
Internal Medicine
Orthopedics
Internal Medicine
Orthopedics
Internal Medicine
Hospitalist

## 2021-08-17 NOTE — DISCHARGE NOTE NURSING/CASE MANAGEMENT/SOCIAL WORK - NSDCVIVACCINE_GEN_ALL_CORE_FT
COVID-19, mRNA, LNP-S, PF, 30 mcg/0.3 mL dose (Pfizer); 14-Aug-2021 18:02; Shahida Davis (RN); [employer]; [id number] (Exp. Date: 31-Aug-2021); IntraMuscular; Deltoid Right.; 0.3 milliLiter(s);

## 2021-08-17 NOTE — DISCHARGE NOTE NURSING/CASE MANAGEMENT/SOCIAL WORK - PATIENT PORTAL LINK FT
You can access the FollowMyHealth Patient Portal offered by [facility] by registering at the following website: [email or web address]. By joining [employer]’s FollowMyHealth portal, you will also be able to view your health information using other applications (apps) compatible with our system.

## 2021-08-17 NOTE — DIETITIAN INITIAL EVALUATION ADULT. - OTHER INFO
Pt admitted with hx of HTN, CKD, anemia, arthritis, p/w BL knee pain. Reports pain has worsened the last couple weeks. Usually uses walker to ambulate, but unable to get out of bed. X-ray of knee reveals supra-patellar effusions with OA. Gout flare started on steroids, knee pain now improving. Medically ready for D/C per MD. Plan for MARYCARMEN at d/c, SW following for set-up.  Pt tolerating regular diet without N/V.  GI: No BM since admission (x5 days). Pt on bowel meds daily. May need additional bowel med/ enema to promote BM   Skin: no breakdown, incision to knee

## 2021-08-17 NOTE — DIETITIAN INITIAL EVALUATION ADULT. - PROBLEM SELECTOR PLAN 3
F: None   E: Replete for K<4 Mag<2  N: Regular Diet  A: lovenox  Code status: Full  Disposition: [facility]

## 2021-08-17 NOTE — DIETITIAN INITIAL EVALUATION ADULT. - PROBLEM SELECTOR PLAN 1
Severe b/l OA 2/2 repeated trauma from exercise. Pain has gotton worse the last couple of weeks, pt cannot ambulate. Concern for septic joint in ED, tap by ortho showing blood w/ crystals, no infection. Admitted as unable to ambulate  -PT recs  -ortho recs regarding b/l knee replacements  -pain control - Voltaren gel for outpatient use    #Gout in Left Knee: 10k WBC + uric acid, physical exam with erythema, would treat for Gout flare of knee as precipitating cause of worsening pain.  -Start Prednisone 40mg Qd   -med rec dose - ordered allpurinol 50mg qd - based on gfr 40

## 2021-08-17 NOTE — PROGRESS NOTE ADULT - SUBJECTIVE AND OBJECTIVE BOX
Internal Medicine Progress Note  Tracie Jayne, PGY-1  Pager: [phone]    ******INCOMPLETE******    Patient is a 55y old  Male who presents with a chief complaint of weakness (16 Aug 2021 12:07)    OVERNIGHT EVENTS/INTERVAL HPI:    OBJECTIVE:  Vital Signs Last 24 Hrs  T(C): 36.9 (17 Aug 2021 05:00), Max: 37.3 (16 Aug 2021 20:08)  T(F): 98.5 (17 Aug 2021 05:00), Max: 99.1 (16 Aug 2021 20:08)  HR: 77 (17 Aug 2021 05:00) (62 - 79)  BP: 154/85 (17 Aug 2021 05:00) (136/84 - 161/91)  BP(mean): 108 (17 Aug 2021 05:00) (108 - 108)  RR: 18 (17 Aug 2021 05:00) (17 - 18)  SpO2: 100% (17 Aug 2021 05:00) (96% - 100%)  I&O's Detail    15 Aug 2021 07:01  -  16 Aug 2021 07:00  --------------------------------------------------------  IN:    Oral Fluid: 620 mL  Total IN: 620 mL    OUT:    Voided (mL): 1570 mL  Total OUT: 1570 mL    Total NET: -950 mL      16 Aug 2021 07:01  -  17 Aug 2021 06:14  --------------------------------------------------------  IN:    Oral Fluid: 970 mL  Total IN: 970 mL    OUT:    Voided (mL): 1600 mL  Total OUT: 1600 mL    Total NET: -630 mL        Physical Exam:  GENERAL: Awake, alert and interactive, no acute distress, appears comfortable  NEURO: A&Ox4, no focal deficits, 5/5 strength in all ext, reflexes 2+ throughout  HEENT: Normocephalic, atraumatic, CN2-12 intact, no conjunctivitis or scleral icterus, oral mucosa moist, no oral lesions noted  NECK: Supple, no JVD, no LAD, thyroid not palpable  CARDIAC: Regular rate and rhythm, +S1/S2, no murmurs/rubs/gallops  PULM: Breathing comfortably on RA, clear to auscultation bilaterally, no wheezes/rales/rhonchi, no inspiratory stridor  ABDOMEN: Soft, nontender, nondistended, +bs, no hepatosplenomegaly, no rebound tenderness or fluid wave, no CVA tenderness  : Deferred  MSK: Range of motion grossly intact, no back tenderness  SKIN: Warm and dry, no rashes, no lesions  VASC: 2+ peripheral pulses, no edema  Psych: Appropriate affect    Medications:  MEDICATIONS  (STANDING):  allopurinol 50 milliGRAM(s) Oral daily  amLODIPine   Tablet 5 milliGRAM(s) Oral daily  atorvastatin 80 milliGRAM(s) Oral at bedtime  heparin   Injectable 5000 Unit(s) SubCutaneous every 8 hours  pantoprazole    Tablet 40 milliGRAM(s) Oral before breakfast  senna 2 Tablet(s) Oral at bedtime    MEDICATIONS  (PRN):  acetaminophen   Tablet .. 975 milliGRAM(s) Oral every 8 hours PRN Mild Pain (1 - 3)  HYDROmorphone   Tablet 2 milliGRAM(s) Oral every 8 hours PRN Severe Pain (7 - 10)  polyethylene glycol 3350 17 Gram(s) Oral daily PRN Constipation      Labs:                        9.1    12.68 )-----------( 294      ( 16 Aug 2021 06:24 )             29.4     08-16    136  |  102  |  49<H>  ----------------------------<  119<H>  5.0   |  25  |  1.88<H>    Ca    9.4      16 Aug 2021 06:24  Phos  2.2     08-16  Mg     2.4     08-16          COVID-19 PCR: NotDetec (11 Aug 2021 21:02)      Radiology: Reviewed

## 2021-08-18 RX ORDER — PANTOPRAZOLE SODIUM 20 MG/1
1 TABLET, DELAYED RELEASE ORAL
Qty: 0 | Refills: 0 | DISCHARGE
Start: 2021-08-18 | End: 2021-08-19

## 2021-08-21 DIAGNOSIS — M10.9 GOUT, UNSPECIFIED: ICD-10-CM

## 2021-08-21 DIAGNOSIS — N18.30 CHRONIC KIDNEY DISEASE, STAGE 3 UNSPECIFIED: ICD-10-CM

## 2021-08-21 DIAGNOSIS — M17.2 BILATERAL POST-TRAUMATIC OSTEOARTHRITIS OF KNEE: ICD-10-CM

## 2021-08-21 DIAGNOSIS — R33.9 RETENTION OF URINE, UNSPECIFIED: ICD-10-CM

## 2021-08-21 DIAGNOSIS — D64.9 ANEMIA, UNSPECIFIED: ICD-10-CM

## 2021-08-21 DIAGNOSIS — I12.9 HYPERTENSIVE CHRONIC KIDNEY DISEASE WITH STAGE 1 THROUGH STAGE 4 CHRONIC KIDNEY DISEASE, OR UNSPECIFIED CHRONIC KIDNEY DISEASE: ICD-10-CM

## 2021-09-01 ENCOUNTER — APPOINTMENT (OUTPATIENT)
Dept: ORTHOPEDIC SURGERY | Facility: CLINIC | Age: 56
End: 2021-09-01

## 2021-09-29 ENCOUNTER — APPOINTMENT (OUTPATIENT)
Dept: NEPHROLOGY | Facility: CLINIC | Age: 56
End: 2021-09-29

## 2021-10-05 ENCOUNTER — APPOINTMENT (OUTPATIENT)
Dept: HEART AND VASCULAR | Facility: CLINIC | Age: 56
End: 2021-10-05

## 2021-10-25 ENCOUNTER — NON-APPOINTMENT (OUTPATIENT)
Age: 56
End: 2021-10-25

## 2021-10-25 RX ORDER — ALLOPURINOL 100 MG/1
100 TABLET ORAL DAILY
Qty: 30 | Refills: 2 | Status: ACTIVE | COMMUNITY
Start: 2020-08-26

## 2021-11-07 ENCOUNTER — EMERGENCY (EMERGENCY)
Facility: HOSPITAL | Age: 56
LOS: 1 days | Discharge: HOME | End: 2021-11-07
Attending: EMERGENCY MEDICINE | Admitting: EMERGENCY MEDICINE
Payer: COMMERCIAL

## 2021-11-07 VITALS
HEART RATE: 69 BPM | DIASTOLIC BLOOD PRESSURE: 81 MMHG | OXYGEN SATURATION: 99 % | RESPIRATION RATE: 16 BRPM | TEMPERATURE: 98 F | HEIGHT: 67 IN | SYSTOLIC BLOOD PRESSURE: 177 MMHG | WEIGHT: 169.98 LBS

## 2021-11-07 VITALS
RESPIRATION RATE: 18 BRPM | OXYGEN SATURATION: 98 % | SYSTOLIC BLOOD PRESSURE: 185 MMHG | HEART RATE: 61 BPM | DIASTOLIC BLOOD PRESSURE: 93 MMHG

## 2021-11-07 DIAGNOSIS — D63.1 ANEMIA IN CHRONIC KIDNEY DISEASE: ICD-10-CM

## 2021-11-07 DIAGNOSIS — M25.561 PAIN IN RIGHT KNEE: ICD-10-CM

## 2021-11-07 DIAGNOSIS — N18.9 CHRONIC KIDNEY DISEASE, UNSPECIFIED: ICD-10-CM

## 2021-11-07 DIAGNOSIS — M19.90 UNSPECIFIED OSTEOARTHRITIS, UNSPECIFIED SITE: ICD-10-CM

## 2021-11-07 DIAGNOSIS — M10.9 GOUT, UNSPECIFIED: ICD-10-CM

## 2021-11-07 DIAGNOSIS — I12.9 HYPERTENSIVE CHRONIC KIDNEY DISEASE WITH STAGE 1 THROUGH STAGE 4 CHRONIC KIDNEY DISEASE, OR UNSPECIFIED CHRONIC KIDNEY DISEASE: ICD-10-CM

## 2021-11-07 DIAGNOSIS — M25.562 PAIN IN LEFT KNEE: ICD-10-CM

## 2021-11-07 PROCEDURE — 99284 EMERGENCY DEPT VISIT MOD MDM: CPT

## 2021-11-07 RX ORDER — AMLODIPINE BESYLATE 2.5 MG/1
1 TABLET ORAL
Qty: 30 | Refills: 0
Start: 2021-11-07 | End: 2021-12-06

## 2021-11-07 RX ORDER — AMLODIPINE BESYLATE 2.5 MG/1
10 TABLET ORAL ONCE
Refills: 0 | Status: COMPLETED | OUTPATIENT
Start: 2021-11-07 | End: 2021-11-07

## 2021-11-07 RX ORDER — OXYCODONE AND ACETAMINOPHEN 5; 325 MG/1; MG/1
2 TABLET ORAL ONCE
Refills: 0 | Status: DISCONTINUED | OUTPATIENT
Start: 2021-11-07 | End: 2021-11-07

## 2021-11-07 RX ORDER — OXYCODONE AND ACETAMINOPHEN 5; 325 MG/1; MG/1
1 TABLET ORAL
Qty: 12 | Refills: 0
Start: 2021-11-07 | End: 2021-11-09

## 2021-11-07 RX ADMIN — AMLODIPINE BESYLATE 10 MILLIGRAM(S): 2.5 TABLET ORAL at 19:52

## 2021-11-07 RX ADMIN — OXYCODONE AND ACETAMINOPHEN 2 TABLET(S): 5; 325 TABLET ORAL at 19:53

## 2021-11-07 NOTE — ED PROVIDER NOTE - CLINICAL SUMMARY MEDICAL DECISION MAKING FREE TEXT BOX
57 yo M with pmh of HTN, CKD, anemia, arthritis, gout c/o needing his BP and pain meds for his gout/arthritis. Pt states he ran out 2 weeks ago and has been unable to get them because of transportation issues. pt states he walks with a walker at home but has trouble getting to the pharmacy or to his appointments. Admits to pain in knees and feet from his arthritis and gout. Also reports HA when he doesn't take his meds. Denies fever, chills, dizziness, cp, sob, abd pain. 55 yo M with pmh of HTN, CKD, anemia, arthritis, gout c/o needing his BP and pain meds for his gout/arthritis. Pt states he ran out 2 weeks ago and has been unable to get them because of transportation issues. pt states he walks with a walker at home but has trouble getting to the pharmacy or to his appointments. Admits to pain in knees and feet from his arthritis and gout. Also reports HA when he doesn't take his meds. Denies fever, chills, dizziness, cp, sob, abd pain. Pt given pain meds and medications refilled. Spoke with SW, helped pt to arrange meds to be delivered and access-a-ride services. Will dc with lazaro

## 2021-11-07 NOTE — ED ADULT TRIAGE NOTE - CHIEF COMPLAINT QUOTE
Pt BIBA from home CO HTN and pain from arthritis and gout.  Pt states "I haven't had my meds in 2 weeks."  Denies N/V/D, SOB, Fevers, CP and Dizziness.

## 2021-11-07 NOTE — ED PROVIDER NOTE - PHYSICAL EXAMINATION
CONSTITUTIONAL: Well-appearing;  in no apparent distress.   HEAD: Normocephalic; atraumatic.   EYES: PERRL; EOM intact; conjunctiva and sclera clear  ENT: normal nose; no rhinorrhea; normal pharynx with no erythema or lesions.   NECK: Supple; non-tender; no LAD  CARDIOVASCULAR: Normal S1, S2; No audible murmurs. Regular rate and rhythm.   RESPIRATORY: Breathing easily; breath sounds clear and equal bilaterally; no wheezes, rhonchi, or rales.  GI: Soft; non-distended; non-tender; no palpable organomegaly.   MSK:   EXT: No cyanosis or edema; N/V intact  SKIN: Normal for age and race; warm; dry; good turgor; no apparent lesions or rash.   NEURO: A & O x 3; face symmetric; grossly unremarkable.   PSYCHOLOGICAL: The patient’s mood and manner are appropriate.

## 2021-11-07 NOTE — ED ADULT NURSE REASSESSMENT NOTE - NS ED NURSE REASSESS COMMENT FT1
pt was in rehab and has been home for about a month now but states that he does not get any help at home , he walks with a walker and is having increasing pain /swelling in bilateral knees and ankles

## 2021-11-07 NOTE — ED PROVIDER NOTE - PATIENT PORTAL LINK FT
You can access the FollowMyHealth Patient Portal offered by [facility] by registering at the following website: [email or web address]. By joining [employer]’s FollowMyHealth portal, you will also be able to view your health information using other applications (apps) compatible with our system.

## 2021-11-07 NOTE — ED PROVIDER NOTE - NSFOLLOWUPINSTRUCTIONS_ED_ALL_ED_FT
Hypertension    Hypertension, commonly called high blood pressure, is when the force of blood pumping through your arteries is too strong. Hypertension forces your heart to work harder to pump blood. Your arteries may become narrow or stiff. Having untreated or uncontrolled hypertension for a long period of time can cause heart attack, stroke, kidney disease, and other problems. If started on a medication, take exactly as prescribed by your health care professional. Maintain a healthy lifestyle and follow up with your primary care physician.    SEEK IMMEDIATE MEDICAL CARE IF YOU HAVE ANY OF THE FOLLOWING SYMPTOMS: severe headache, confusion, chest pain, abdominal pain, vomiting, or shortness of breath.    Musculoskeletal Pain    WHAT YOU NEED TO KNOW:    Muscle pain can be dull, achy, or sharp. You may have pain and tenderness to the touch as well. It can occur anywhere on your body and is often brought on by exercise. Muscle pain may occur from an injury, such as a sprain, tendonitis, or bone fracture. Muscle pain can also be the result of medical conditions, such as polymyositis, fibromyalgia, and connective tissue disorders.     DISCHARGE INSTRUCTIONS:    Self care:     Rest as directed and avoid activity that causes pain. You may be able to return to normal activity when you can move without pain. Follow directions for rest and activity. You are at risk for injury for 3 weeks after your symptoms go away.       Ice your painful muscle area to decrease pain and swelling. Use an ice pack, or put ice in a plastic bag and cover it with a towel. Always put a cloth between the ice and your skin. Apply the ice as often as directed for the first 24 to 48 hours.       Compression with a splint, brace, or elastic bandage helps decrease pain and swelling. This may be needed for muscle pain in arms or legs. A splint, brace, or bandage will also help protect the painful area when you move around.       Elevate a painful arm or leg to reduce swelling and pain. Elevate your limb while you are sitting or lying down. Prop a painful leg on pillows to keep it above the level of your heart.    Medicines:     NSAIDs help decrease swelling and pain or fever. This medicine is available with or without a doctor's order. NSAIDs can cause stomach bleeding or kidney problems in certain people. If you take blood thinner medicine, always ask your healthcare provider if NSAIDs are safe for you. Always read the medicine label and follow directions.      Acetaminophen is used to decrease pain. It is available without a doctor's order. Ask your healthcare provider how much to take and when to take it. Follow directions. Acetaminophen can cause liver damage if not taken correctly. Do not take more than one medicine that contains acetaminophen unless directed.       Muscle relaxers help relax your muscles to decrease pain and muscle spasms.       Steroids may be given to decrease redness, pain, and swelling.      Take your medicine as directed. Contact your healthcare provider if you think your medicine is not helping or if you have side effects. Tell him if you are allergic to any medicine. Keep a list of the medicines, vitamins, and herbs you take. Include the amounts, and when and why you take them. Bring the list or the pill bottles to follow-up visits. Carry your medicine list with you in case of an emergency.    Follow up with your healthcare provider as directed: You may need more tests to help healthcare providers find the cause of your muscle pain. You may need physical therapy to learn muscle strengthening exercises. Write down your questions so you remember to ask them during your visits.     Contact your healthcare provider if:     You have a fever.       You have trouble sleeping because of your pain.       Your painful area becomes more tender, red, and warm to the touch.       You have decreased movement of the painful area.       You have questions or concerns about your condition or care.    Return to the emergency department if:     You have increased severe pain when you move the painful muscle area.       You lose feeling in your painful muscle area.       You have new or worse swelling in the painful area. Your skin may feel tight.       You have increased muscle pain or pain that does not improve with treatment.

## 2021-11-07 NOTE — ED PROVIDER NOTE - OBJECTIVE STATEMENT
57 yo M with pmh of HTN, CKD, anemia, arthritis, gout c/o needing his BP and pain meds for his gout/arthritis. Pt states he ran out 2 weeks ago and has been unable to get them because of transportation issues. pt states he walks with a walker at home but has trouble getting to the pharmacy or to his appointments. Admits to pain in knees and feet from his arthritis and gout. Also reports HA when he doesn't take his meds. Denies fever, chills, dizziness, cp, sob, abd pain. Pt takes amlodipine, prednisone and percocet.

## 2021-11-07 NOTE — ED ADULT NURSE NOTE - OBJECTIVE STATEMENT
pt states that he is having arthritis and gout pain and has not had his medication in 2 weeks because he ran out , also c/o htn

## 2023-04-28 NOTE — H&P ADULT - ATTENDING SUPERVISION STATEMENT
Alert-The patient is alert, awake and responds to voice. The patient is oriented to time, place, and person. The triage nurse is able to obtain subjective information. Resident

## 2023-10-13 NOTE — ED PROVIDER NOTE - MDM ORDERS SUBMITTED SELECTION
Sliding Scale Insulin instructions:  Check blood sugar before each meal three time a day  For the following blood sugar values administer the describe insulin amount in units.    Do Not give Insulin if Pre-Meal blood glucose less than 140.   For Pre-Meal blood glucose 140 - 164 give 1 unit.   For Pre-Meal blood glucose 165 - 189 give 2 units.   For Pre-Meal blood glucose 190 - 214 give 3 units.   For Pre-Meal blood glucose 215 - 239 give 4 units.   For Pre-Meal blood glucose 240 - 264 give 5 units.   For Pre-Meal blood glucose 265 - 289 give 6 units.   For Pre-Meal blood glucose 290 - 314 give 7 units.   For Pre-Meal blood glucose 315 - 339 give 8 units.   For Pre-Meal blood glucose 340 - 364 give 9 units.  For Pre-Meal blood glucose greater than or equal to 365 give 10 units    What you may eat or drink after angiogram:  -- There are no changes in what you should eat or drink after this test except that you should drink at least six to eight 8-ounce glasses of fluid each day for 2 days to flush the contrast (dye) out of your body unless you are on a limited fluids diet.    Moving around after angiogram:  -- After your test: Rest 48 hours and follow the special activity instructions listed.    Special activity restrictions:  -- Limit physical activity for 48 hours.  Rest on a sofa or bed as much as possible.  -- No strenuous activity.  -- No long walks.  -- Avoid climbing stairs if possible.  If you must climb stairs, do it slowly.    Lifting:  -- Do not lift more than 10 pounds for 48 hours.   -- Do not lift more than 20 pounds for 7 days. (A full gallon of water weighs about 8 pounds)    Sexual activity:  -- You can resume sexual activity in 2 days.  Bathing/Swimming:  -- You may shower in 24 hours .  -- You may NOT take a tub bath, swim, or sit in water for 5 days.    The groin puncture site:  Care:  -- Keep the area clean and dry except for during daily shower.  -- Clean the site daily using soap and water while  standing in the shower.  Pat dry thoroughly.  -- Cover the groin site with a bandaid until the skin has closed.   -- When you sneeze, cough or laugh, hold pressure on the puncture site.  -- If you must strain when having a bowel movement, hold gentle pressure to the puncture site.    Anesthesia or sedation information:  You have received medication for your procedure that made you sleepy:  -- You may be sleepy, feel less coordinated or feel weak.  To prevent injury, be careful when you walk, bathe, cook or use electrical devices/tools.  -- You may NOT drive or operate mechanical equipment until 24 hours after your procedure.  You also must stop taking narcotic pain medications before driving.  -- A responsible adult should remain with you for at least 24 hours after your procedure.  You should stay at home and rest today.  -- This may cause you to react differently to alcohol.  Do not drink alcohol for at least 24 hours after your procedure.  -- This may cause you to not think clearly.  Do not make important decisions for 24 hours after your procedure.  -- To prevent burns, do not smoke.    Local anesthesia:  -- You had local anesthesia (numbing medicine) for your procedure.  The numbness should go away a few hours after the procedure.    Your medicines:  -- It is important that you take the medicines on your list.  Work with your health care provider or pharmacist if you have questions about your medicine.    Return to work:  -- You can return to work in 24 hours if your work does not stop you from following your care instructions (such as lifting).  If your work does not allow you to follow the instructions, you should return to work in 48 hours.    For any questions or concerns please call:  Tena Sexton at [facility] [phone] and option 1 during business hours.   Off hours: [phone]  Tracy Doty MD  Endovascular Surgical Neuroradiology Fellow  [facility]  [place]  [phone]         Medications